# Patient Record
Sex: FEMALE | Race: BLACK OR AFRICAN AMERICAN | NOT HISPANIC OR LATINO | Employment: OTHER | URBAN - METROPOLITAN AREA
[De-identification: names, ages, dates, MRNs, and addresses within clinical notes are randomized per-mention and may not be internally consistent; named-entity substitution may affect disease eponyms.]

---

## 2017-10-30 ENCOUNTER — HISTORICAL (OUTPATIENT)
Dept: INTERNAL MEDICINE | Facility: CLINIC | Age: 72
End: 2017-10-30

## 2017-10-30 LAB
ABS NEUT (OLG): 1.71 X10(3)/MCL (ref 2.1–9.2)
ALBUMIN SERPL-MCNC: 3.7 GM/DL (ref 3.4–5)
ALBUMIN/GLOB SERPL: 1 RATIO (ref 1–2)
ALP SERPL-CCNC: 97 UNIT/L (ref 45–117)
ALT SERPL-CCNC: 17 UNIT/L (ref 12–78)
AST SERPL-CCNC: 28 UNIT/L (ref 15–37)
BASOPHILS # BLD AUTO: 0.02 X10(3)/MCL
BASOPHILS NFR BLD AUTO: 0 % (ref 0–1)
BILIRUB SERPL-MCNC: 0.3 MG/DL (ref 0.2–1)
BILIRUBIN DIRECT+TOT PNL SERPL-MCNC: <0.1 MG/DL
BILIRUBIN DIRECT+TOT PNL SERPL-MCNC: ABNORMAL MG/DL
BUN SERPL-MCNC: 22 MG/DL (ref 7–18)
CALCIUM SERPL-MCNC: 9.5 MG/DL (ref 8.5–10.1)
CHLORIDE SERPL-SCNC: 101 MMOL/L (ref 98–107)
CHOLEST SERPL-MCNC: 169 MG/DL
CHOLEST/HDLC SERPL: 3.2 {RATIO} (ref 0–4.4)
CO2 SERPL-SCNC: 25 MMOL/L (ref 21–32)
CREAT SERPL-MCNC: 1.3 MG/DL (ref 0.6–1.3)
EOSINOPHIL # BLD AUTO: 0.07 X10(3)/MCL
EOSINOPHIL NFR BLD AUTO: 2 % (ref 0–5)
ERYTHROCYTE [DISTWIDTH] IN BLOOD BY AUTOMATED COUNT: 13.9 % (ref 11.5–14.5)
EST. AVERAGE GLUCOSE BLD GHB EST-MCNC: 131 MG/DL
GLOBULIN SER-MCNC: 4.8 GM/ML (ref 2.3–3.5)
GLUCOSE SERPL-MCNC: 96 MG/DL (ref 74–106)
HBA1C MFR BLD: 6.2 % (ref 4.2–6.3)
HCT VFR BLD AUTO: 33.6 % (ref 35–46)
HDLC SERPL-MCNC: 52 MG/DL
HGB BLD-MCNC: 11.8 GM/DL (ref 12–16)
IMM GRANULOCYTES # BLD AUTO: 0.01 10*3/UL
IMM GRANULOCYTES NFR BLD AUTO: 0 %
LDLC SERPL CALC-MCNC: 102 MG/DL (ref 0–130)
LYMPHOCYTES # BLD AUTO: 1.22 X10(3)/MCL
LYMPHOCYTES NFR BLD AUTO: 33 % (ref 15–40)
MCH RBC QN AUTO: 30.1 PG (ref 26–34)
MCHC RBC AUTO-ENTMCNC: 35.1 GM/DL (ref 31–37)
MCV RBC AUTO: 85.7 FL (ref 80–100)
MONOCYTES # BLD AUTO: 0.66 X10(3)/MCL
MONOCYTES NFR BLD AUTO: 18 % (ref 4–12)
NEUTROPHILS # BLD AUTO: 1.71 X10(3)/MCL
NEUTROPHILS NFR BLD AUTO: 46 X10(3)/MCL
PLATELET # BLD AUTO: 338 X10(3)/MCL (ref 130–400)
PMV BLD AUTO: 8.7 FL (ref 7.4–10.4)
POTASSIUM SERPL-SCNC: 3.7 MMOL/L (ref 3.5–5.1)
PROT SERPL-MCNC: 8.5 GM/DL (ref 6.4–8.2)
RBC # BLD AUTO: 3.92 X10(6)/MCL (ref 4–5.2)
SODIUM SERPL-SCNC: 133 MMOL/L (ref 136–145)
TRIGL SERPL-MCNC: 75 MG/DL
TSH SERPL-ACNC: 0.92 MIU/L (ref 0.36–3.74)
VLDLC SERPL CALC-MCNC: 15 MG/DL
WBC # SPEC AUTO: 3.7 X10(3)/MCL (ref 4.5–11)

## 2017-12-06 ENCOUNTER — HISTORICAL (OUTPATIENT)
Dept: RADIOLOGY | Facility: HOSPITAL | Age: 72
End: 2017-12-06

## 2020-07-13 ENCOUNTER — HISTORICAL (OUTPATIENT)
Dept: RADIOLOGY | Facility: HOSPITAL | Age: 75
End: 2020-07-13

## 2021-04-26 ENCOUNTER — HISTORICAL (OUTPATIENT)
Dept: CARDIOLOGY | Facility: HOSPITAL | Age: 76
End: 2021-04-26

## 2021-11-11 ENCOUNTER — HISTORICAL (OUTPATIENT)
Dept: RADIOLOGY | Facility: HOSPITAL | Age: 76
End: 2021-11-11

## 2022-03-08 ENCOUNTER — HISTORICAL (OUTPATIENT)
Dept: ADMINISTRATIVE | Facility: HOSPITAL | Age: 77
End: 2022-03-08

## 2022-04-07 ENCOUNTER — HISTORICAL (OUTPATIENT)
Dept: ADMINISTRATIVE | Facility: HOSPITAL | Age: 77
End: 2022-04-07
Payer: COMMERCIAL

## 2022-04-23 VITALS
SYSTOLIC BLOOD PRESSURE: 183 MMHG | DIASTOLIC BLOOD PRESSURE: 82 MMHG | WEIGHT: 166.88 LBS | HEIGHT: 61 IN | BODY MASS INDEX: 31.51 KG/M2

## 2022-06-16 ENCOUNTER — HOSPITAL ENCOUNTER (EMERGENCY)
Facility: HOSPITAL | Age: 77
Discharge: HOME | End: 2022-06-16
Attending: EMERGENCY MEDICINE
Payer: COMMERCIAL

## 2022-06-16 VITALS
OXYGEN SATURATION: 96 % | BODY MASS INDEX: 27.49 KG/M2 | HEIGHT: 65 IN | DIASTOLIC BLOOD PRESSURE: 87 MMHG | TEMPERATURE: 98 F | SYSTOLIC BLOOD PRESSURE: 157 MMHG | WEIGHT: 165 LBS | HEART RATE: 76 BPM | RESPIRATION RATE: 16 BRPM

## 2022-06-16 DIAGNOSIS — I10 HYPERTENSION, UNSPECIFIED TYPE: ICD-10-CM

## 2022-06-16 DIAGNOSIS — R07.9 CHEST PAIN: ICD-10-CM

## 2022-06-16 DIAGNOSIS — R41.82 ALTERED MENTAL STATUS, UNSPECIFIED ALTERED MENTAL STATUS TYPE: ICD-10-CM

## 2022-06-16 DIAGNOSIS — R42 DIZZINESS: ICD-10-CM

## 2022-06-16 DIAGNOSIS — R51.9 NONINTRACTABLE HEADACHE, UNSPECIFIED CHRONICITY PATTERN, UNSPECIFIED HEADACHE TYPE: Primary | ICD-10-CM

## 2022-06-16 PROBLEM — F05 ACUTE CONFUSIONAL STATE: Status: ACTIVE | Noted: 2022-06-16

## 2022-06-16 LAB
ABS NEUT (OLG): 2.9 X10(3)/MCL (ref 2.1–9.2)
ALBUMIN SERPL-MCNC: 3.6 GM/DL (ref 3.4–4.8)
ALBUMIN/GLOB SERPL: 0.8 RATIO (ref 1.1–2)
ALP SERPL-CCNC: 101 UNIT/L (ref 40–150)
ALT SERPL-CCNC: 15 UNIT/L (ref 0–55)
AST SERPL-CCNC: 37 UNIT/L (ref 5–34)
BILIRUBIN DIRECT+TOT PNL SERPL-MCNC: 0.4 MG/DL
BUN SERPL-MCNC: 9.9 MG/DL (ref 9.8–20.1)
CALCIUM SERPL-MCNC: 9.5 MG/DL (ref 8.4–10.2)
CHLORIDE SERPL-SCNC: 108 MMOL/L (ref 98–107)
CO2 SERPL-SCNC: 23 MMOL/L (ref 23–31)
CREAT SERPL-MCNC: 0.91 MG/DL (ref 0.55–1.02)
ERYTHROCYTE [DISTWIDTH] IN BLOOD BY AUTOMATED COUNT: 18.8 % (ref 11.5–17)
ETHANOL SERPL-MCNC: <10 MG/DL
FLUAV AG UPPER RESP QL IA.RAPID: NOT DETECTED
FLUBV AG UPPER RESP QL IA.RAPID: NOT DETECTED
GLOBULIN SER-MCNC: 4.5 GM/DL (ref 2.4–3.5)
GLUCOSE SERPL-MCNC: 111 MG/DL (ref 82–115)
HCT VFR BLD AUTO: 35.2 % (ref 37–47)
HGB BLD-MCNC: 11.1 GM/DL (ref 12–16)
IMM GRANULOCYTES # BLD AUTO: 0.02 X10(3)/MCL (ref 0–0.02)
IMM GRANULOCYTES NFR BLD AUTO: 0.4 % (ref 0–0.43)
INSTRUMENT WBC (OLG): 5 X10(3)/MCL
LYMPHOCYTES NFR BLD MANUAL: 1.65 X10(3)/MCL
LYMPHOCYTES NFR BLD MANUAL: 33 %
MACROCYTES BLD QL SMEAR: ABNORMAL
MCH RBC QN AUTO: 26.4 PG (ref 27–31)
MCHC RBC AUTO-ENTMCNC: 31.5 MG/DL (ref 33–36)
MCV RBC AUTO: 83.6 FL (ref 80–94)
MONOCYTES NFR BLD MANUAL: 0.45 X10(3)/MCL (ref 0.1–1.3)
MONOCYTES NFR BLD MANUAL: 9 %
NEUTROPHILS NFR BLD MANUAL: 58 %
NRBC BLD AUTO-RTO: 0 %
PLATELET # BLD AUTO: 307 X10(3)/MCL (ref 130–400)
PLATELET # BLD EST: NORMAL 10*3/UL
PMV BLD AUTO: 9.3 FL (ref 9.4–12.4)
POCT GLUCOSE: 83 MG/DL (ref 70–110)
POTASSIUM SERPL-SCNC: 4.8 MMOL/L (ref 3.5–5.1)
PROT SERPL-MCNC: 8.1 GM/DL (ref 5.8–7.6)
RBC # BLD AUTO: 4.21 X10(6)/MCL (ref 4.2–5.4)
RBC MORPH BLD: ABNORMAL
SARS-COV-2 RNA RESP QL NAA+PROBE: NOT DETECTED
SODIUM SERPL-SCNC: 141 MMOL/L (ref 136–145)
TROPONIN I SERPL-MCNC: <0.01 NG/ML (ref 0–0.04)
WBC # SPEC AUTO: 4.6 X10(3)/MCL (ref 4.5–11.5)

## 2022-06-16 PROCEDURE — 87636 SARSCOV2 & INF A&B AMP PRB: CPT | Performed by: NURSE PRACTITIONER

## 2022-06-16 PROCEDURE — 96374 THER/PROPH/DIAG INJ IV PUSH: CPT

## 2022-06-16 PROCEDURE — 36415 COLL VENOUS BLD VENIPUNCTURE: CPT | Performed by: STUDENT IN AN ORGANIZED HEALTH CARE EDUCATION/TRAINING PROGRAM

## 2022-06-16 PROCEDURE — 99285 EMERGENCY DEPT VISIT HI MDM: CPT | Mod: 25

## 2022-06-16 PROCEDURE — 63600175 PHARM REV CODE 636 W HCPCS: Performed by: STUDENT IN AN ORGANIZED HEALTH CARE EDUCATION/TRAINING PROGRAM

## 2022-06-16 PROCEDURE — 85007 BL SMEAR W/DIFF WBC COUNT: CPT | Performed by: NURSE PRACTITIONER

## 2022-06-16 PROCEDURE — 82077 ASSAY SPEC XCP UR&BREATH IA: CPT | Performed by: STUDENT IN AN ORGANIZED HEALTH CARE EDUCATION/TRAINING PROGRAM

## 2022-06-16 PROCEDURE — 84484 ASSAY OF TROPONIN QUANT: CPT | Performed by: NURSE PRACTITIONER

## 2022-06-16 PROCEDURE — 36415 COLL VENOUS BLD VENIPUNCTURE: CPT | Performed by: NURSE PRACTITIONER

## 2022-06-16 PROCEDURE — 85025 COMPLETE CBC W/AUTO DIFF WBC: CPT | Performed by: NURSE PRACTITIONER

## 2022-06-16 PROCEDURE — 80053 COMPREHEN METABOLIC PANEL: CPT | Performed by: NURSE PRACTITIONER

## 2022-06-16 RX ORDER — HYDRALAZINE HYDROCHLORIDE 20 MG/ML
20 INJECTION INTRAMUSCULAR; INTRAVENOUS
Status: COMPLETED | OUTPATIENT
Start: 2022-06-16 | End: 2022-06-16

## 2022-06-16 RX ORDER — HYDRALAZINE HYDROCHLORIDE 20 MG/ML
20 INJECTION INTRAMUSCULAR; INTRAVENOUS
Status: DISCONTINUED | OUTPATIENT
Start: 2022-06-16 | End: 2022-06-16

## 2022-06-16 RX ADMIN — HYDRALAZINE HYDROCHLORIDE 20 MG: 20 INJECTION, SOLUTION INTRAMUSCULAR; INTRAVENOUS at 01:06

## 2022-06-16 NOTE — ASSESSMENT & PLAN NOTE
Patient examined by neurology on call .... no recommendation for IV tPA  Request medical records from [facility] to resume medications

## 2022-06-16 NOTE — CONSULTS
"[facility]  Emergency Dept  Neurology  Consult Note    Patient Name: Dolores Ta  MRN: [id number]  Admission Date: 6/16/2022  Hospital Length of Stay: 0 days  Code Status: No Order   Attending Provider: Anders Yu IV, MD   Consulting Provider: Nasim Perez MD  Primary Care Physician: Primary Doctor No  Principal Problem:<principal problem not specified>    Inpatient consult to Vascular (Stroke) Neurology  Consult performed by: ESMER Chinchilla  Consult ordered by: ESMER Chinchilla         Subjective:     Chief Complaint:    Chief Complaint   Patient presents with    Headache     HA and nausea that started a few days ago. Elevated BP today per patient.  Hx of HTN.         HPI:   Ms Ta is a 76-year-old female with past medical history of anxiety, HTN, chronic HA, presented to ED on 6/16 with reports of abdominal pain, headache, and "not feeling right".  She reports using her emergency call button this morning because of abdominal pain, and was brought to ED for further evaluation.    While in ED, patient became confused.  No unilateral weakness.  Stroke alert was activated.  CTh showed no acute intracranial abnormalities.  Discussed with neurology on call, who examined patient in ED ... no recommendation for IV tPA due to no focal symptoms.    Stroke alert activated at 1224  Stroke NP responded at 1226      Of note, patient's daughter (Katie) stated that patient recently stopped taking her medications.  Reports similar episode of confusion, which is why she was seen at [facility] and started on medications.       Past Medical History:   Diagnosis Date    Hypertension        No past surgical history on file.    Review of patient's allergies indicates:  No Known Allergies      No current facility-administered medications on file prior to encounter.     No current outpatient medications on file prior to encounter.     Family History    None       Tobacco Use    Smoking " status: Not on file    Smokeless tobacco: Not on file   Substance and Sexual Activity    Alcohol use: Not on file    Drug use: Not on file    Sexual activity: Not on file     Review of Systems   Psychiatric/Behavioral:  Positive for confusion.    All other systems reviewed and are negative.    Objective:     Vital Signs (Most Recent):  Temp: 98.2 °F (36.8 °C) (06/16/22 0755)  Pulse: (!) 54 (06/16/22 1346)  Resp: 15 (06/16/22 1346)  BP: (!) 159/80 (06/16/22 1346)  SpO2: 100 % (06/16/22 1346)   Vital Signs (24h Range):  Temp:  [98.2 °F (36.8 °C)] 98.2 °F (36.8 °C)  Pulse:  [38-55] 54  Resp:  [15-21] 15  SpO2:  [99 %-100 %] 100 %  BP: (159-197)/() 159/80     Weight: 74.8 kg (165 lb)  Body mass index is 27.46 kg/m².    Physical Exam  Constitutional:       Appearance: She is not ill-appearing or toxic-appearing.   Eyes:      General: No visual field deficit.     Extraocular Movements: Extraocular movements intact.      Pupils: Pupils are equal, round, and reactive to light.   Cardiovascular:      Rate and Rhythm: Bradycardia present.   Pulmonary:      Effort: Pulmonary effort is normal.   Skin:     General: Skin is warm and dry.   Neurological:      Mental Status: She is alert. She is confused.      Cranial Nerves: No dysarthria or facial asymmetry.      Sensory: Sensation is intact.      Motor: Motor function is intact.       Significant Labs: BMP:   Recent Labs   Lab 06/16/22  1010      K 4.8   CO2 23   BUN 9.9   CREATININE 0.91   CALCIUM 9.5     CBC:   Recent Labs   Lab 06/16/22  1011   WBC 4.6   HGB 11.1*   HCT 35.2*          Significant Imaging: I have reviewed all pertinent imaging results/findings within the past 24 hours.    CTH: no acute intracranial abnormalities    Assessment and Plan:     Acute confusional state  Patient examined by neurology on call .... no recommendation for IV tPA  Request medical records from [facility] to resume medications        Thank you for your consult. Further  recommendations to follow by MD.    Manasa Alves, RONEYP  Neurology  [facility] - Emergency Dept

## 2022-06-16 NOTE — SUBJECTIVE & OBJECTIVE
Past Medical History:   Diagnosis Date    Hypertension        No past surgical history on file.    Review of patient's allergies indicates:  No Known Allergies      No current facility-administered medications on file prior to encounter.     No current outpatient medications on file prior to encounter.     Family History    None       Tobacco Use    Smoking status: Not on file    Smokeless tobacco: Not on file   Substance and Sexual Activity    Alcohol use: Not on file    Drug use: Not on file    Sexual activity: Not on file     Review of Systems   Psychiatric/Behavioral:  Positive for confusion.    All other systems reviewed and are negative.    Objective:     Vital Signs (Most Recent):  Temp: 98.2 °F (36.8 °C) (06/16/22 0755)  Pulse: (!) 54 (06/16/22 1346)  Resp: 15 (06/16/22 1346)  BP: (!) 159/80 (06/16/22 1346)  SpO2: 100 % (06/16/22 1346)   Vital Signs (24h Range):  Temp:  [98.2 °F (36.8 °C)] 98.2 °F (36.8 °C)  Pulse:  [38-55] 54  Resp:  [15-21] 15  SpO2:  [99 %-100 %] 100 %  BP: (159-197)/() 159/80     Weight: 74.8 kg (165 lb)  Body mass index is 27.46 kg/m².    Physical Exam  Constitutional:       Appearance: She is not ill-appearing or toxic-appearing.   Eyes:      General: No visual field deficit.     Extraocular Movements: Extraocular movements intact.      Pupils: Pupils are equal, round, and reactive to light.   Cardiovascular:      Rate and Rhythm: Bradycardia present.   Pulmonary:      Effort: Pulmonary effort is normal.   Skin:     General: Skin is warm and dry.   Neurological:      Mental Status: She is alert. She is confused.      Cranial Nerves: No dysarthria or facial asymmetry.      Sensory: Sensation is intact.      Motor: Motor function is intact.       Significant Labs: BMP:   Recent Labs   Lab 06/16/22  1010      K 4.8   CO2 23   BUN 9.9   CREATININE 0.91   CALCIUM 9.5     CBC:   Recent Labs   Lab 06/16/22  1011   WBC 4.6   HGB 11.1*   HCT 35.2*          Significant  Imaging: I have reviewed all pertinent imaging results/findings within the past 24 hours.    CTH: no acute intracranial abnormalities

## 2022-06-16 NOTE — ED PROVIDER NOTES
"Encounter Date: 6/16/2022       History     Chief Complaint   Patient presents with    Headache     HA and nausea that started a few days ago. Elevated BP today per patient.  Hx of HTN.      Patient presents with:  Headache: HA and nausea that started a few days ago. Elevated BP today per patient.  Hx of HTN.     I Celestino Mottkaley ALVAREZ assumed care of pt at 1138, elderly female with history of hypertension anxiety and chronic headaches reports having anxiety and vomiting last night  having severe headache this morning with nausea vomiting and dizziness "drunk like feeling "reports near fall while walking across house this morning pressing emergency on call button and transported by ambulance to emergency department for initial evaluation.    I, Anders Yu, assumed care of patient from Celestino Ramesh ALVAREZ. Patient with history of HTN, anxiety, headaches, depression on medications, recent bizarre behavior, (possibly early onset dementia per previous chart review). presenting after she called EMS for feeling "not right',, headache, diffuse myalgias. Very poor historian. Collateral provided by daughters later report patient sometimes acts bizarre when not taking medications - reports that she has had multiple episodes of bizarre behavior over past months, is seen at [facility] and prescribed antidepressants there. Didn't take her antidepressants for the past few days.     Patient denies SI/HI/AVH.     The history is provided by the patient. No  was used.     Review of patient's allergies indicates:  No Known Allergies  Past Medical History:   Diagnosis Date    Hypertension      History reviewed. No pertinent surgical history.  History reviewed. No pertinent family history.     Review of Systems   Constitutional: Negative for chills and fever.   HENT: Positive for sinus pressure and sneezing. Negative for sore throat.    Eyes: Negative for pain.   Respiratory: Negative for shortness of breath.  "   Cardiovascular: Positive for chest pain.   Gastrointestinal: Positive for nausea and vomiting. Negative for diarrhea.   Endocrine: Negative.    Genitourinary: Negative for dysuria.   Musculoskeletal: Negative for back pain and myalgias.   Skin: Negative for rash.   Neurological: Positive for dizziness and headaches. Negative for syncope.   Hematological: Does not bruise/bleed easily.   Psychiatric/Behavioral: The patient is nervous/anxious.        Physical Exam     Initial Vitals [06/16/22 0755]   BP Pulse Resp Temp SpO2   (!) 167/84 (!) 48 18 98.2 °F (36.8 °C) 100 %      MAP       --         Physical Exam    Nursing note and vitals reviewed.  Constitutional: She appears well-developed and well-nourished. She is not diaphoretic. She is active.  Non-toxic appearance. No distress.   HENT:   Head: Normocephalic and atraumatic.   Eyes: Conjunctivae and EOM are normal. Pupils are equal, round, and reactive to light.   Pupils 3 mm to 2 mm bilaterally   Neck: Trachea normal. Neck supple. No JVD present.   Normal range of motion.  Cardiovascular: Normal pulses. Bradycardia present.    Pulmonary/Chest: Breath sounds normal. No respiratory distress.   Abdominal: Abdomen is soft. She exhibits no distension. There is no abdominal tenderness.   Musculoskeletal:         General: No edema. Normal range of motion.      Right upper arm: Normal.      Left upper arm: Normal.      Cervical back: Normal, normal range of motion and neck supple.      Thoracic back: Normal.      Lumbar back: Normal.      Right upper leg: Normal.      Left upper leg: Normal.      Right lower leg: Normal.      Left lower leg: Normal.     Neurological: She is alert. She has normal strength.   Pt with expressive aphasia. She is stuttering and unable to formulate words. Pt acutely agitated. Awake, alert.   Skin: Skin is warm, dry and intact.   Psychiatric: Her mood appears anxious.         ED Course   Procedures  Labs Reviewed   COMPREHENSIVE METABOLIC PANEL  - Abnormal; Notable for the following components:       Result Value    Chloride 108 (*)     Protein Total 8.1 (*)     Globulin 4.5 (*)     Albumin/Globulin Ratio 0.8 (*)     Aspartate Aminotransferase 37 (*)     All other components within normal limits   CBC WITH DIFFERENTIAL - Abnormal; Notable for the following components:    Hgb 11.1 (*)     Hct 35.2 (*)     MCH 26.4 (*)     MCHC 31.5 (*)     RDW 18.8 (*)     MPV 9.3 (*)     IG# 0.02 (*)     All other components within normal limits   MANUAL DIFFERENTIAL - Abnormal; Notable for the following components:    Abs Lymp 1.65 (*)     RBC Morph Abnormal (*)     Macrocyte 1+ (*)     All other components within normal limits   TROPONIN I - Normal   COVID/FLU A&B PCR - Normal   ALCOHOL,MEDICAL (ETHANOL) - Normal   CBC W/ AUTO DIFFERENTIAL    Narrative:     The following orders were created for panel order CBC Auto Differential.  Procedure                               Abnormality         Status                     ---------                               -----------         ------                     CBC with Differential[[id number]]        Abnormal            Final result               Manual Differential[[id number]]          Abnormal            Final result                 Please view results for these tests on the individual orders.   POCT GLUCOSE     EKG Readings: (Independently Interpreted)   Ectopy: PVCs. Conduction: Normal. ST Segments: Normal ST Segments. T Waves: Normal. Axis: Normal.   Time 0801   Sinus mahi at 52     ECG Results          EKG 12-lead (Final result)  Result time 06/16/22 17:13:02    Final result by Interface, Lab In [facility] (06/16/22 17:13:02)                 Narrative:    Test Reason : R07.9,    Vent. Rate : 052 BPM     Atrial Rate : 052 BPM     P-R Int : 204 ms          QRS Dur : 094 ms      QT Int : 446 ms       P-R-T Axes : 072 -16 043 degrees     QTc Int : 414 ms    Sinus bradycardia with marked sinus arrythmia with occasional  Premature  ventricular complexes  Otherwise normal ECG  No previous ECGs available  Confirmed by Xiao ARECHIGA, Michelet ([id number]) on 6/16/2022 5:12:50 PM    Referred By: MARIANA   SELF           Confirmed By:Michelet Hagen MD                            Imaging Results          CT Head Without Contrast (Final result)  Result time 06/16/22 12:38:40    Final result by Jerry De Guzman MD (06/16/22 12:38:40)                 Impression:      No acute intracranial abnormality identified.      Electronically signed by: Jerry De Guzman  Date:    06/16/2022  Time:    12:38             Narrative:    EXAMINATION:  CT HEAD WITHOUT CONTRAST    CLINICAL HISTORY:  Headache, sudden, severe;    TECHNIQUE:  Low dose axial images were obtained through the head.  Coronal and sagittal reformations were also performed. Contrast was not administered.    Automatic exposure control was utilized to reduce the patient's radiation dose.    DLP= 903    COMPARISON:  03/08/2022    FINDINGS:  No acute intracranial hemorrhage, edema or mass. No acute parenchymal abnormality.    Mild cerebral atrophy with concordant ventricular enlargement.    Scattered hypodensities throughout the deep periventricular white matter.    The osseous structures are normal.    The mastoid air cells are clear.    The auditory canals are patent bilaterally.    The globes and orbital contents are normal bilaterally.    The visualized maxillary, ethmoid and sphenoid sinuses are clear.                              X-Rays:   Independently Interpreted Readings:   Head CT: No hemorrhage.     Medications   hydrALAZINE injection 20 mg (20 mg Intravenous Given 6/16/22 1332)     Medical Decision Making:   History:   I obtained history from: EMS provider and someone other than patient.       <> Summary of History: 3 daughters bedside - see ED course   Old Medical Records: I decided to obtain old medical records.  Initial Assessment:   77 yo , poor historian, history HTN, depression,  anxiety, initially seen by midlevel for headaches, malaise, dizziness. Patient called EMS herself, lives alone. Patient initially hypertensive, bradycardic, concern for head bleed/acs. On my exam patient seemed to be aphasic without focal neuro deficits, so code fast called, returned to normal mental status quickly, negative head ct, neurology didn't recommend any further workup.    Rest of workup unremarkable, patient's daughters showed up to provide collateral history in ed course. Seems patient has these episodes from time to time, especially when noncompliant with depression medications. Possible early onset dementia per chart review? Patient not currently suicidal, homicidal, or responding to internal stimuli. Due to concern over patient not taking meds, considered and discussed PEC or admission with daughter. They feel patient is safe at home and will stay with her until she sees her psychiatrist at [facility]. Decision made to discharge in care of daughters. Return precautions given. Patient was bradycardic initially but stable and asymptomatic from that standpoint. Normal HR on discharge. Improved BP after hydralazine.   Clinical Tests:   Lab Tests: Ordered and Reviewed  Radiological Study: Reviewed and Ordered  Medical Tests: Ordered and Reviewed             ED Course as of 06/16/22 2051   Thu Jun 16, 2022   1210 Case reviewed in ER with Dr Yu [TQ]   1215 Patient now with new neuro status changes slurred speech activated code fast. [TQ]   1230 Patient discussed with me by [facility]le[facility] provider - history of chronic headaches, HTN, anxiety - presenting for headache, noted to be bradycardic. Provider requested that I come evaluate the patient, when I started to speak with her she became asphasic, struggling to get words out, agitated. Glucose 83. CODE fast activated. Will obtain stat head CT - possible bleed in setting of hypertension , AMS, bradycardia.  [AC]   1244 CT head no bleed per my read [AC]   1509 Patient  "back to baseline in CT and at this time - reporting to me that she "gets like this when she doesn't take her medicine". Family en route to provide collateral.  [AC]   8299 Spoke at length with 3 of patient's daughter bedside - they report that mom goes to [facility] for medications for depression, has had some abnormal behaviors over past several months - has [facility] counseling classes for coping with the loss of her  there but hasn't been going. Seems undergoing outpateint workup for dementia? Patient deneis SI/HI/AVH at this time. Daughters report that they believe patient is safe at home - they will take her home and stay with her and bring patient to [facility] tomorrow for further evaluation. Patient medical workup unremarkable. Will discharge home at this time in daughter's care - they are bedside and amendable to this plan.  [AC]      ED Course User Index  [AC] Anders Yu IV, MD  [TQ] ANTONIO Vásquez             Clinical Impression:   Final diagnoses:  [R51.9] Nonintractable headache, unspecified chronicity pattern, unspecified headache type (Primary)  [I10] Hypertension, unspecified type  [R42] Dizziness  [R41.82] Altered mental status, unspecified altered mental status type          ED Disposition Condition    Discharge Stable        ED Prescriptions     None        Follow-up Information     Follow up With Specialties Details Why Contact Info    [facility] - Emergency Dept Emergency Medicine Go to  If symptoms worsen [address] [facility] [id number] [phone]    PCP  Schedule an appointment as soon as possible for a visit         I, Anders Yu MD personally performed the history, PE, MDM, and procedures as documented above and agree with the scribe's documentation.        Anders Yu IV, MD  06/16/22 2051    "

## 2022-06-16 NOTE — HPI
"Ms Ta is a 76-year-old female with past medical history of anxiety, HTN, chronic HA, presented to ED on 6/16 with reports of abdominal pain, headache, and "not feeling right".  She reports using her emergency call button this morning because of abdominal pain, and was brought to ED for further evaluation.    While in ED, patient became confused.  No unilateral weakness.  Stroke alert was activated.  CTh showed no acute intracranial abnormalities.  Discussed with neurology on call, who examined patient in ED ... no recommendation for IV tPA due to no focal symptoms.    Stroke alert activated at 1224  Stroke NP responded at 1226      Of note, patient's daughter (Katie) stated that patient recently stopped taking her medications.  Reports similar episode of confusion, which is why she was seen at [facility] and started on medications.  "

## 2022-07-11 DIAGNOSIS — I10 HYPERTENSION, UNSPECIFIED TYPE: Primary | ICD-10-CM

## 2022-07-11 RX ORDER — AMLODIPINE BESYLATE 5 MG/1
5 TABLET ORAL DAILY
COMMUNITY
Start: 2021-10-01 | End: 2022-07-11 | Stop reason: SDUPTHER

## 2022-07-14 RX ORDER — AMLODIPINE BESYLATE 5 MG/1
5 TABLET ORAL DAILY
Qty: 30 TABLET | Refills: 1 | Status: SHIPPED | OUTPATIENT
Start: 2022-07-14 | End: 2022-07-21 | Stop reason: SDUPTHER

## 2022-07-15 RX ORDER — LISINOPRIL 40 MG/1
40 TABLET ORAL DAILY
Qty: 30 TABLET | Refills: 0 | Status: CANCELLED | OUTPATIENT
Start: 2022-07-15 | End: 2022-08-14

## 2022-07-15 RX ORDER — LISINOPRIL 40 MG/1
40 TABLET ORAL DAILY
COMMUNITY
Start: 2022-06-13 | End: 2022-07-21 | Stop reason: SDUPTHER

## 2022-07-15 RX ORDER — AMLODIPINE BESYLATE 5 MG/1
5 TABLET ORAL DAILY
Qty: 30 TABLET | Refills: 1 | Status: CANCELLED | OUTPATIENT
Start: 2022-07-15

## 2022-07-21 ENCOUNTER — OFFICE VISIT (OUTPATIENT)
Dept: FAMILY MEDICINE | Facility: CLINIC | Age: 77
End: 2022-07-21
Payer: COMMERCIAL

## 2022-07-21 VITALS
WEIGHT: 169.56 LBS | SYSTOLIC BLOOD PRESSURE: 149 MMHG | TEMPERATURE: 99 F | HEIGHT: 65 IN | HEART RATE: 50 BPM | DIASTOLIC BLOOD PRESSURE: 78 MMHG | BODY MASS INDEX: 28.25 KG/M2 | OXYGEN SATURATION: 100 %

## 2022-07-21 DIAGNOSIS — R41.89 COGNITIVE IMPAIRMENT: ICD-10-CM

## 2022-07-21 DIAGNOSIS — K21.9 GASTROESOPHAGEAL REFLUX DISEASE, UNSPECIFIED WHETHER ESOPHAGITIS PRESENT: ICD-10-CM

## 2022-07-21 DIAGNOSIS — Z87.891 HISTORY OF TOBACCO ABUSE: ICD-10-CM

## 2022-07-21 DIAGNOSIS — I10 HYPERTENSION, UNSPECIFIED TYPE: Primary | ICD-10-CM

## 2022-07-21 PROCEDURE — 99213 OFFICE O/P EST LOW 20 MIN: CPT | Mod: PBBFAC

## 2022-07-21 RX ORDER — ESCITALOPRAM OXALATE 5 MG/1
10 TABLET ORAL DAILY
COMMUNITY
Start: 2021-10-25 | End: 2022-07-21 | Stop reason: SDUPTHER

## 2022-07-21 RX ORDER — LISINOPRIL 40 MG/1
40 TABLET ORAL DAILY
Qty: 30 TABLET | Refills: 3 | Status: SHIPPED | OUTPATIENT
Start: 2022-07-21 | End: 2022-11-08

## 2022-07-21 RX ORDER — DONEPEZIL HYDROCHLORIDE 5 MG/1
5 TABLET, FILM COATED ORAL EVERY MORNING
COMMUNITY
Start: 2022-06-13 | End: 2022-07-21 | Stop reason: SDUPTHER

## 2022-07-21 RX ORDER — TRAZODONE HYDROCHLORIDE 50 MG/1
50 TABLET ORAL DAILY
COMMUNITY
Start: 2021-10-25 | End: 2022-07-21 | Stop reason: SDUPTHER

## 2022-07-21 RX ORDER — DONEPEZIL HYDROCHLORIDE 5 MG/1
5 TABLET, FILM COATED ORAL DAILY
COMMUNITY
Start: 2021-10-25 | End: 2022-07-21

## 2022-07-21 RX ORDER — AMLODIPINE BESYLATE 5 MG/1
5 TABLET ORAL DAILY
Qty: 30 TABLET | Refills: 3 | Status: SHIPPED | OUTPATIENT
Start: 2022-07-21 | End: 2022-09-20

## 2022-07-21 RX ORDER — TRAZODONE HYDROCHLORIDE 50 MG/1
50 TABLET ORAL NIGHTLY
Qty: 30 TABLET | Refills: 3 | Status: SHIPPED | OUTPATIENT
Start: 2022-07-21 | End: 2022-12-30 | Stop reason: SDUPTHER

## 2022-07-21 RX ORDER — TRAZODONE HYDROCHLORIDE 50 MG/1
50 TABLET ORAL NIGHTLY
Qty: 30 TABLET | Refills: 1 | Status: SHIPPED | OUTPATIENT
Start: 2022-07-21 | End: 2022-07-21 | Stop reason: SDUPTHER

## 2022-07-21 RX ORDER — ESCITALOPRAM OXALATE 10 MG/1
10 TABLET ORAL EVERY MORNING
COMMUNITY
Start: 2022-06-13 | End: 2022-07-21 | Stop reason: SDUPTHER

## 2022-07-21 RX ORDER — MEMANTINE HYDROCHLORIDE 10 MG/1
10 TABLET ORAL 2 TIMES DAILY
Qty: 60 TABLET | Refills: 3 | Status: SHIPPED | OUTPATIENT
Start: 2022-07-21 | End: 2022-09-20 | Stop reason: SINTOL

## 2022-07-21 RX ORDER — DONEPEZIL HYDROCHLORIDE 5 MG/1
5 TABLET, FILM COATED ORAL EVERY MORNING
Qty: 30 TABLET | Refills: 1 | Status: SHIPPED | OUTPATIENT
Start: 2022-07-21 | End: 2022-07-21 | Stop reason: SDUPTHER

## 2022-07-21 RX ORDER — ESCITALOPRAM OXALATE 5 MG/1
10 TABLET ORAL DAILY
Qty: 60 TABLET | Refills: 3 | Status: SHIPPED | OUTPATIENT
Start: 2022-07-21 | End: 2022-09-08 | Stop reason: SDUPTHER

## 2022-07-21 RX ORDER — MEMANTINE HYDROCHLORIDE 10 MG/1
10 TABLET ORAL 2 TIMES DAILY
COMMUNITY
Start: 2022-07-14 | End: 2022-07-21 | Stop reason: SDUPTHER

## 2022-07-21 RX ORDER — ESCITALOPRAM OXALATE 10 MG/1
10 TABLET ORAL EVERY MORNING
Qty: 30 TABLET | Refills: 1 | Status: SHIPPED | OUTPATIENT
Start: 2022-07-21 | End: 2022-07-21 | Stop reason: SDUPTHER

## 2022-07-21 RX ORDER — TRAZODONE HYDROCHLORIDE 50 MG/1
50 TABLET ORAL NIGHTLY
COMMUNITY
Start: 2022-06-13 | End: 2022-07-21 | Stop reason: SDUPTHER

## 2022-07-21 NOTE — PROGRESS NOTES
[facility] Geriatrics clinic    Subjective:        Dolores Ta is a 76 y.o. female who  has a past medical history of Hypertension. GERD, obesity, tobacco abuse   Who presents to  Geriatrics clinic today for follow up. Last office visit in 3/2022 for establishment of care with Geriatric clinic. Alysia (daughter) is POA . She is present with patient today and assists in providing history     Patient  Was previously being treated at [facility] 3x/week for anxiety. Patient's daughter states she was doing well, but patient's daughter reports that her other daughter discontinued care at [facility], and was told that she needs to be re-established as a new patient. Patient would like Alysia to be her POA, and has signed paper work today in order to make daughter Alysia primary POA and her other daughter Katie as her secondary POA.   Home health referral was sent for assistance with medication management and transportation help, patient states that this has not yet been set up . Patient was previously followed by Dr. Nila Napier; it appears that she has not yet been re-assigned to a new PCP, and she would like to be set up in [facility] for her primary care needs.     Main problem today was medication reconciliation- appears that aricept was discontinued on her last visit in 3/2022, but patient is still prescribed this medication. Otherwise, no complaints at this time. Patient and daughter have not noted any further worsening of cognitive status. She denies any fever, chills, headache, blurring of vision, chest pain, shortness of breath, abdominal pain, nausea, vomiting, urinary or bowel issues.     Geriatrics Assessment  BADLs: bathes independently, grooming and dressing independently, no continence issues, feeds independently  IADLs:  Daughter manages finances, still drives, goes shopping, no issues with meal preparation or laundry  Fall risk: last fall: reports 1 fall in the past 3 months,  no LOC or head trauma  Labs: TSH wnl,  Cognition: SLUMS 7/30 (3/2022)  Mood: appropriate,  denies depressive symptoms  Polypharmacy: high risk medications: escitalopram, trazodone, donepezil   Social support: Children come to visit her regularly  Goals of care: daughter wants patient to continue living at home with support from family members as well as home health   Advance care preferences: power of - Alysia (daughter)   Nutrition/weight change: no weight loss, good appetite   Vision: No recent changes to vision; wears glasses  Hearing: No issues with hearing   Living situation: lives alone        Review of Systems:  10 point ROS negative except for HPI    Objective:   Vital Signs:  Pulse 50 /78     BMI 28.2    General:  Well developed, well nourished, no acute respiratory distress  Head: Normocephalic, atraumatic  Eyes: PERRL, EOMI, anicteric sclera  Throat: No posterior pharyngeal erythema or exudate, no tonsillar exudate  Neck: supple, normal ROM, no thyromegaly   CVS:  RRR, S1 and S2 normal, no murmurs, no added heart sounds, rubs, gallops, 2+ peripheral pulses  Resp:  Lungs clear to auscultation bilaterally, no wheezes, rales, or rhonci  GI:  Abdomen soft, non-tender, non-distended, normoactive bowel sounds  MSK:  No muscle atrophy, cyanosis, peripheral edema, full range of motion  Skin:  No rashes, ulcers, erythema  Neuro:  Alert and oriented x3, No focal neuro deficits, CNII-XII grossly intact  Psych:  Appropriate mood and affect         Laboratory:  Lab Results   Component Value Date    WBC 4.6 06/16/2022    HGB 11.1 (L) 06/16/2022    HCT 35.2 (L) 06/16/2022     06/16/2022    MCV 83.6 06/16/2022    RDW 18.8 (H) 06/16/2022    Lab Results   Component Value Date     06/16/2022    K 4.8 06/16/2022    CO2 23 06/16/2022    BUN 9.9 06/16/2022    CREATININE 0.91 06/16/2022    CALCIUM 9.5 06/16/2022      Lab Results   Component Value Date    HGBA1C 6.2 10/30/2017     (H)  10/30/2017    CREATININE 0.91 06/16/2022    Lab Results   Component Value Date    TSH 1.4533 03/08/2022                  Current Medications:  Current Outpatient Medications   Medication Instructions    amLODIPine (NORVASC) 5 mg, Oral, Daily    donepeziL (ARICEPT) 5 mg, Oral, Every morning    EScitalopram oxalate (LEXAPRO) 10 mg, Oral, Every morning    lisinopriL (PRINIVIL,ZESTRIL) 40 mg, Oral, Daily    traZODone (DESYREL) 50 mg, Oral, Nightly        Assessment and Plan:    Cognitive Impairment:  -SLUMS exam 3/24/22 7/30  -Donepezil was discontinued on last visit due to concerns for bradycardia, weakness/ dizziness, however appears that patient is still prescribed this and still taking   -DISCONTINUE donepezil at this time  -Continue Memantine 10 mg BID  -Repeat SLUMS assessment at next visit, consider checking RPR at this time, as it appears this was not completed as part of initial cognitive impairment workup     Anxiety  -taking Lexapro and trazodone  -Patient needs to be re-established at [facility]     HTN  -continue   lisinopril 40mg daily, amlodipine 5 daily  -previously prescribed Hydralazine 25 BID, patient states that this was discontinued by her psychiatrist at [facility]  -Will request records from [facility]  -BP slightly elevated today  -If BP remains elevated at next ff up appt, consider restarting Hydralazine at that time    Tobacco user  -smoking 1/2 ppd since age 21 y/o; recently quit smoking 3 weeks ago  -continue to encourage complete cessation     Health Maintenance  Colon cancer screening: no FIT or colonoscopy on file; will need at next visit  Lung Cancer screen: no lung screening on file, patient is a 27 pack year smoker   Mammogram: last MG done 7/2020 was BIRADS 1 (negative) bilaterally, will need on next appt  PapSmear: None on file  Hep C: consider 1 time screening on next visit  Dexa: None on file  Vaccines      Pneumonia: PPSV 23 done 11/2016, PCV 13 done 2/21/2018      Annual Flu:  offer when available      Zoster: 1 dose done 1/2014      Tdap: No record of Tdap on file        Follow up in 2 months  Patient would like to establish PCP care at [facility]  Needs to be re-established at [facility]  Request outside records from [facility]   Follow up status of home health on next appt   Repeat [facility] assessment on next visit; consider checking RPR at that time  With regards to Health maintenance, no hx of FIT or colonoscopy, no lung cancer screening done, no dexa on file, has not undergone 1 time hep c screening, needs shingrix #2 as well as Tdap       Barbara Stewart MD  Internal Medicine, PGY-2

## 2022-07-25 ENCOUNTER — TELEPHONE (OUTPATIENT)
Dept: FAMILY MEDICINE | Facility: CLINIC | Age: 77
End: 2022-07-25

## 2022-07-25 NOTE — TELEPHONE ENCOUNTER
Jori Jiménez,LPC with [facility] called and stated that they will not be able to take the patient back in the outpatient clinic do to her cognitive impairment.

## 2022-09-08 DIAGNOSIS — R41.89 COGNITIVE IMPAIRMENT: ICD-10-CM

## 2022-09-08 DIAGNOSIS — F05 ACUTE CONFUSIONAL STATE: Primary | ICD-10-CM

## 2022-09-08 RX ORDER — DONEPEZIL HYDROCHLORIDE 5 MG/1
5 TABLET, FILM COATED ORAL EVERY MORNING
Qty: 90 TABLET | Refills: 1 | Status: SHIPPED | OUTPATIENT
Start: 2022-09-08 | End: 2023-05-11 | Stop reason: SDUPTHER

## 2022-09-08 RX ORDER — DONEPEZIL HYDROCHLORIDE 5 MG/1
5 TABLET, FILM COATED ORAL EVERY MORNING
COMMUNITY
Start: 2022-08-15 | End: 2022-09-08 | Stop reason: SDUPTHER

## 2022-09-08 RX ORDER — ESCITALOPRAM OXALATE 5 MG/1
10 TABLET ORAL DAILY
Qty: 60 TABLET | Refills: 3 | Status: SHIPPED | OUTPATIENT
Start: 2022-09-08 | End: 2023-04-18 | Stop reason: SDUPTHER

## 2022-09-20 ENCOUNTER — OFFICE VISIT (OUTPATIENT)
Dept: FAMILY MEDICINE | Facility: CLINIC | Age: 77
End: 2022-09-20
Payer: COMMERCIAL

## 2022-09-20 VITALS
HEART RATE: 49 BPM | WEIGHT: 171.5 LBS | BODY MASS INDEX: 28.57 KG/M2 | TEMPERATURE: 98 F | SYSTOLIC BLOOD PRESSURE: 155 MMHG | OXYGEN SATURATION: 100 % | RESPIRATION RATE: 20 BRPM | DIASTOLIC BLOOD PRESSURE: 80 MMHG | HEIGHT: 65 IN

## 2022-09-20 DIAGNOSIS — R41.89 COGNITIVE IMPAIRMENT: ICD-10-CM

## 2022-09-20 DIAGNOSIS — R05.9 COUGH: Primary | ICD-10-CM

## 2022-09-20 DIAGNOSIS — D50.9 MICROCYTIC ANEMIA: ICD-10-CM

## 2022-09-20 DIAGNOSIS — I10 HYPERTENSION, UNSPECIFIED TYPE: ICD-10-CM

## 2022-09-20 DIAGNOSIS — J40 BRONCHITIS: ICD-10-CM

## 2022-09-20 DIAGNOSIS — Z87.891 HISTORY OF TOBACCO ABUSE: ICD-10-CM

## 2022-09-20 DIAGNOSIS — Z12.39 ENCOUNTER FOR SCREENING FOR MALIGNANT NEOPLASM OF BREAST, UNSPECIFIED SCREENING MODALITY: ICD-10-CM

## 2022-09-20 LAB
ANION GAP SERPL CALC-SCNC: 7 MEQ/L
BASOPHILS # BLD AUTO: 0.01 X10(3)/MCL (ref 0–0.2)
BASOPHILS NFR BLD AUTO: 0.2 %
BUN SERPL-MCNC: 7.2 MG/DL (ref 9.8–20.1)
CALCIUM SERPL-MCNC: 9.7 MG/DL (ref 8.4–10.2)
CHLORIDE SERPL-SCNC: 105 MMOL/L (ref 98–107)
CO2 SERPL-SCNC: 28 MMOL/L (ref 23–31)
CREAT SERPL-MCNC: 1.05 MG/DL (ref 0.55–1.02)
CREAT/UREA NIT SERPL: 7
EOSINOPHIL # BLD AUTO: 0.1 X10(3)/MCL (ref 0–0.9)
EOSINOPHIL NFR BLD AUTO: 2.4 %
ERYTHROCYTE [DISTWIDTH] IN BLOOD BY AUTOMATED COUNT: 18.5 % (ref 11.5–17)
FERRITIN SERPL-MCNC: 16.43 NG/ML (ref 4.63–204)
GFR SERPLBLD CREATININE-BSD FMLA CKD-EPI: 56 MLS/MIN/1.73/M2
GLUCOSE SERPL-MCNC: 95 MG/DL (ref 82–115)
HCT VFR BLD AUTO: 33.5 % (ref 37–47)
HGB BLD-MCNC: 10.4 GM/DL (ref 12–16)
IMM GRANULOCYTES # BLD AUTO: 0 X10(3)/MCL (ref 0–0.04)
IMM GRANULOCYTES NFR BLD AUTO: 0 %
IRON SATN MFR SERPL: 8 % (ref 20–50)
IRON SERPL-MCNC: 27 UG/DL (ref 50–170)
LYMPHOCYTES # BLD AUTO: 1.6 X10(3)/MCL (ref 0.6–4.6)
LYMPHOCYTES NFR BLD AUTO: 38.7 %
MCH RBC QN AUTO: 27.5 PG (ref 27–31)
MCHC RBC AUTO-ENTMCNC: 31 MG/DL (ref 33–36)
MCV RBC AUTO: 88.6 FL (ref 80–94)
MONOCYTES # BLD AUTO: 0.73 X10(3)/MCL (ref 0.1–1.3)
MONOCYTES NFR BLD AUTO: 17.7 %
NEUTROPHILS # BLD AUTO: 1.7 X10(3)/MCL (ref 2.1–9.2)
NEUTROPHILS NFR BLD AUTO: 41 %
NRBC BLD AUTO-RTO: 0 %
PLATELET # BLD AUTO: 306 X10(3)/MCL (ref 130–400)
PMV BLD AUTO: 9.9 FL (ref 7.4–10.4)
POTASSIUM SERPL-SCNC: 4.5 MMOL/L (ref 3.5–5.1)
RBC # BLD AUTO: 3.78 X10(6)/MCL (ref 4.2–5.4)
SARS-COV-2 RNA RESP QL NAA+PROBE: NOT DETECTED
SODIUM SERPL-SCNC: 140 MMOL/L (ref 136–145)
TIBC SERPL-MCNC: 300 UG/DL (ref 70–310)
TIBC SERPL-MCNC: 327 UG/DL (ref 250–450)
TRANSFERRIN SERPL-MCNC: 299 MG/DL (ref 173–360)
WBC # SPEC AUTO: 4.1 X10(3)/MCL (ref 4.5–11.5)

## 2022-09-20 PROCEDURE — 83540 ASSAY OF IRON: CPT | Performed by: FAMILY MEDICINE

## 2022-09-20 PROCEDURE — 82728 ASSAY OF FERRITIN: CPT | Performed by: FAMILY MEDICINE

## 2022-09-20 PROCEDURE — 87635 SARS-COV-2 COVID-19 AMP PRB: CPT | Performed by: FAMILY MEDICINE

## 2022-09-20 PROCEDURE — 36415 COLL VENOUS BLD VENIPUNCTURE: CPT | Performed by: FAMILY MEDICINE

## 2022-09-20 PROCEDURE — 85025 COMPLETE CBC W/AUTO DIFF WBC: CPT | Performed by: FAMILY MEDICINE

## 2022-09-20 PROCEDURE — 99214 OFFICE O/P EST MOD 30 MIN: CPT | Mod: PBBFAC | Performed by: FAMILY MEDICINE

## 2022-09-20 PROCEDURE — 80048 BASIC METABOLIC PNL TOTAL CA: CPT | Performed by: FAMILY MEDICINE

## 2022-09-20 RX ORDER — AMLODIPINE BESYLATE 10 MG/1
10 TABLET ORAL DAILY
Qty: 30 TABLET | Refills: 5 | Status: SHIPPED | OUTPATIENT
Start: 2022-09-20 | End: 2023-05-11 | Stop reason: SDUPTHER

## 2022-09-20 RX ORDER — AZITHROMYCIN 250 MG/1
TABLET, FILM COATED ORAL
Qty: 6 TABLET | Refills: 0 | Status: SHIPPED | OUTPATIENT
Start: 2022-09-20 | End: 2022-09-25

## 2022-09-20 RX ORDER — MEMANTINE HYDROCHLORIDE 5 MG/1
TABLET ORAL
Qty: 42 TABLET | Refills: 0 | OUTPATIENT
Start: 2022-09-20 | End: 2023-04-03

## 2022-09-20 NOTE — PATIENT INSTRUCTIONS
Will need prior home health company name, consider reinstating for medication management while tapering medications.     Will need to stop namenda (memantine) as the side effect can be low heart rate.   Take 5mg (need new tabs, cannot cut old tabs in half) twice a day for 2 weeks,   5mg once a day for 2 weeks, then stop.     We need to increase the dose of your Blood pressure medication Amlodipine, to 10mg daily.     Both prescriptions have been sent to the pharmacy [facility].       I will call your daughters to discuss your needs further.

## 2022-09-20 NOTE — PROGRESS NOTES
[facility] Geriatrics clinic    Subjective:        Dolores Ta is a 76 y.o. female who  has a past medical history of Hypertension. GERD, obesity, tobacco abuse who presents to  Geriatrics clinic today for follow up. Last office visit in 3/2022 for establishment of care with Geriatric clinic. Alysia (daughter) is POA. Patient's sister is present with patient today and assists in providing history.    Previous PCP was Dr. Nila Napier this is Ms. Ta's 2 visit with us.    Patient complains of headache located superolaterally on both her eyes which began this morning. She also complains of a sinus drip associated with a productive cough with whitish colored mucus that began yesterday evening.  Patient has a ~25 pack year hx (~50 yrs, 0.5 pack/daily), with cessation failed attempt since quitting last visit. She denies any fever, chills, blurring of vision, chest pain, shortness of breath, abdominal pain, nausea, vomiting, urinary or bowel issues.     Geriatrics Assessment (7/21/22)  BADLs: bathes independently, grooming and dressing independently, no continence issues, feeds independently  IADLs:  Daughter manages finances, still drives, goes shopping, no issues with meal preparation or laundry  Fall risk: last fall: reports 1 fall in the past 3 months, no LOC or head trauma  Labs: TSH wnl,  Cognition: SLUMS 7/30 (3/2022)  Mood: appropriate,  denies depressive symptoms  Polypharmacy: high risk medications: escitalopram, trazodone, donepezil   Social support: Children come to visit her regularly  Goals of care: daughter wants patient to continue living at home with support from family members as well as home health   Advance care preferences: power of - Alysia (daughter)   Nutrition/weight change: no weight loss, good appetite   Vision: No recent changes to vision; wears glasses  Hearing: No issues with hearing   Living situation: lives alone        Review of Systems:  10 point  "ROS negative except for HPI    Objective:   Vital Signs:  BP (!) 155/80 (BP Location: Left arm, Patient Position: Sitting, BP Method: Medium (Automatic))   Pulse (!) 49   Temp 98.4 °F (36.9 °C) (Oral)   Resp 20   Ht 5' 5" (1.651 m)   Wt 77.8 kg (171 lb 8.3 oz)   LMP  (LMP Unknown) Comment: Hysterectomy  SpO2 100%   BMI 28.54 kg/m²     Review of Systems   Constitutional:  Negative for chills, diaphoresis and fever.   HENT:  Positive for sinus pain. Negative for congestion, ear pain and sore throat.    Eyes:  Negative for pain and discharge.   Respiratory:  Positive for cough and sputum production. Negative for hemoptysis and shortness of breath.    Cardiovascular:  Negative for chest pain, orthopnea, claudication, leg swelling and PND.   Gastrointestinal:  Negative for abdominal pain, diarrhea, nausea and vomiting.     Physical Exam  Constitutional:       Appearance: Normal appearance.   HENT:      Head: Normocephalic and atraumatic.      Mouth/Throat:      Pharynx: No oropharyngeal exudate or posterior oropharyngeal erythema.   Eyes:      Extraocular Movements: Extraocular movements intact.      Pupils: Pupils are equal, round, and reactive to light.   Cardiovascular:      Rate and Rhythm: Normal rate and regular rhythm.      Pulses: Normal pulses.      Heart sounds: Normal heart sounds. No murmur heard.    No gallop.   Pulmonary:      Effort: Pulmonary effort is normal. No respiratory distress.      Breath sounds: Normal breath sounds. No stridor. No wheezing or rales.   Abdominal:      General: Abdomen is flat.      Palpations: Abdomen is soft.   Musculoskeletal:         General: No swelling or deformity. Normal range of motion.      Cervical back: Normal range of motion and neck supple. No rigidity.   Skin:     General: Skin is warm.      Capillary Refill: Capillary refill takes less than 2 seconds.      Coloration: Skin is not jaundiced.   Neurological:      General: No focal deficit present.      Mental " Status: She is alert.   Psychiatric:         Mood and Affect: Mood normal.         Thought Content: Thought content normal.         Judgment: Judgment normal.           Laboratory:  Lab Results   Component Value Date    WBC 4.6 06/16/2022    HGB 11.1 (L) 06/16/2022    HCT 35.2 (L) 06/16/2022     06/16/2022    MCV 83.6 06/16/2022    RDW 18.8 (H) 06/16/2022    Lab Results   Component Value Date     06/16/2022    K 4.8 06/16/2022    CO2 23 06/16/2022    BUN 9.9 06/16/2022    CREATININE 0.91 06/16/2022    CALCIUM 9.5 06/16/2022      Lab Results   Component Value Date    HGBA1C 6.2 10/30/2017     (H) 10/30/2017    CREATININE 0.91 06/16/2022    Lab Results   Component Value Date    TSH 1.4533 03/08/2022                  Current Medications:  Current Outpatient Medications   Medication Instructions    amLODIPine (NORVASC) 5 mg, Oral, Daily    donepeziL (ARICEPT) 5 mg, Oral, Every morning    EScitalopram oxalate (LEXAPRO) 10 mg, Oral, Daily    lisinopriL (PRINIVIL,ZESTRIL) 40 mg, Oral, Daily    memantine (NAMENDA) 10 mg, Oral, 2 times daily    traZODone (DESYREL) 50 mg, Oral, Nightly        Assessment and Plan:   Cognitive Impairment:  Bradycardia likely 2/2 AchE Inh Use  -SLUMS exam 3/24/22 7/30  -Repeat SLUMS assessment at next visit, consider checking RPR at this time, as it appears this was not completed as part of initial cognitive impairment workup   - Bradycardia exhibited likely related to use of Memantine and Donepezil    Anxiety  - Taking Lexapro and Trazodone  - Patient needs to be re-established at [facility]     HTN  - /80 today  - Continue lisinopril 40mg once daily  - Increased amlodipine 5 to 10 mg once daily  - Previously prescribed Hydralazine 25 BID, patient states that this was discontinued by her psychiatrist at [facility]  - If BP remains elevated at next ff up appt, consider restarting Hydralazine at that time    Tobacco user  - Patient began smoking again since quitting  last visit  - Quit date yesterday due to development of new cough  - Continue to encourage complete cessation     Health Maintenance  Colon cancer screening: no FIT or colonoscopy on file; will need at next visit  Lung Cancer screen: no lung screening on file, patient is a 27 pack year smoker   Mammogram: last MG done 7/2020 was BIRADS 1 (negative) bilaterally, will need on next appt  PapSmear: None on file  Hep C: consider 1 time screening on next visit  Dexa: None on file  Vaccines      Pneumonia: PPSV 23 done 11/2016, PCV 13 done 2/21/2018      Annual Flu: offer when available      Zoster: 1 dose done 1/2014      Tdap: No record of Tdap on file        Follow up in 4 weeks  Patient would like to establish PCP care at [facility]  Needs to be re-established at [facility]   Request outside records from [facility]   Follow up status of home health on next appt   Repeat [email or web address] assessment on next visit; consider checking RPR at that time  With regards to Health maintenance, no hx of FIT or colonoscopy, no lung cancer screening done, no dexa on file, has not undergone 1 time hep c screening, needs shingrix #2 as well as Tdap       Mark Cruz MD  \[facility]\"" Internal Medicine, HO-1

## 2022-10-04 NOTE — PROGRESS NOTES
Date of Service: 09/20/2022  Attending Attestation: Patient discussed with resident. The chart was reviewed thoroughly including pertinent vitals, labs, imaging, prior notes, and consultant/specialist recommendations.  I participated in the management of the patient, examined the patient, reviewed the summary of the plan, and was immediately available at all times throughout the encounter. Services were furnished in a primary care center located in the outpatient department of a teaching hospital. I agree with the resident's findings and plan as documented in the resident's note.    Summary of plan:  Heavy chronic smoker with productive cough and sinus headache/pain, rule out COVID w/ swab.  Monitor symptoms and treat symptomatically OTC  HTN - BP elevated today, off prior hydralazine, amlodipine increased today from 5mg, to 10mg daily  Bradycardia noted, attempt to taper namenda and aricept down, watch for behavior changes.   Patient sister and daughters helping with care and medications, patient still forgetting, needs prompting.      Nikole Gupta MD  Attending - Family Medicine / Geriatric Medicine  [facility]

## 2022-10-18 ENCOUNTER — OFFICE VISIT (OUTPATIENT)
Dept: FAMILY MEDICINE | Facility: CLINIC | Age: 77
End: 2022-10-18
Payer: COMMERCIAL

## 2022-10-18 VITALS
OXYGEN SATURATION: 100 % | TEMPERATURE: 98 F | RESPIRATION RATE: 20 BRPM | HEIGHT: 65 IN | WEIGHT: 172.19 LBS | SYSTOLIC BLOOD PRESSURE: 144 MMHG | HEART RATE: 56 BPM | DIASTOLIC BLOOD PRESSURE: 75 MMHG | BODY MASS INDEX: 28.69 KG/M2

## 2022-10-18 DIAGNOSIS — Z00.00 HEALTHCARE MAINTENANCE: Primary | ICD-10-CM

## 2022-10-18 DIAGNOSIS — K21.9 GASTROESOPHAGEAL REFLUX DISEASE, UNSPECIFIED WHETHER ESOPHAGITIS PRESENT: ICD-10-CM

## 2022-10-18 DIAGNOSIS — R41.89 COGNITIVE IMPAIRMENT: ICD-10-CM

## 2022-10-18 DIAGNOSIS — Z87.891 HISTORY OF TOBACCO ABUSE: ICD-10-CM

## 2022-10-18 DIAGNOSIS — I10 HYPERTENSION, UNSPECIFIED TYPE: ICD-10-CM

## 2022-10-18 PROCEDURE — 99215 OFFICE O/P EST HI 40 MIN: CPT | Mod: PBBFAC,25 | Performed by: FAMILY MEDICINE

## 2022-10-18 PROCEDURE — 90694 VACC AIIV4 NO PRSRV 0.5ML IM: CPT | Mod: PBBFAC

## 2022-10-18 PROCEDURE — 90677 PCV20 VACCINE IM: CPT | Mod: PBBFAC

## 2022-10-18 RX ORDER — ALBUTEROL SULFATE 90 UG/1
1-2 AEROSOL, METERED RESPIRATORY (INHALATION) 2 TIMES DAILY PRN
COMMUNITY
Start: 2022-03-24 | End: 2023-04-03 | Stop reason: SDUPTHER

## 2022-10-18 RX ORDER — ALBUTEROL SULFATE 90 UG/1
1-2 AEROSOL, METERED RESPIRATORY (INHALATION) EVERY 6 HOURS PRN
Qty: 18 G | Refills: 2 | OUTPATIENT
Start: 2022-10-18 | End: 2023-04-03

## 2022-10-18 NOTE — PROGRESS NOTES
[facility] Geriatrics clinic    Subjective:        Dolores Ta is a 76 y.o. female who  has a past medical history of Hypertension. GERD, obesity, tobacco abuse who presents to  Geriatrics clinic today for follow up. Last office visit in 9/20/2022. Reports her chronic R hand pain d/t  arthritis. Pt states she takes tylenol and ibuprofen for her pain. Arthritis is worse with cold weather. Previous note states Richmond (daughter) is POA, but this visit pt and daughter (Jovan Martínez) state that there is no POA, richmond just helps with finances. Patient's daughter is present with patient today and assists in providing history. Denies headaches, blurred vision, CP, SOB. Daughter states pt has improved mood and cognition, per family member.    Previous PCP was Dr. Nila Napier this is Ms. Ta's 3rd visit with us.    Last visit pt c/o headache with sinus congestion and productive cough, states she is feeling better but still having some congestion and cough due to her allergies.  Patient has a ~25 pack year hx (~50 yrs, 0.5 pack/daily), has prior cessation attempts, currently endorses 1-2 cigarettes a day.  She denies any fever, chills, blurring of vision, chest pain, shortness of breath, abdominal pain, nausea, vomiting, urinary or bowel issues.     Geriatrics Assessment (10/18/22)  BADLs: bathes independently, grooming and dressing independently, no continence issues, feeds independently. Has not forgotten to turn off stove/oven.  IADLs:  Daughter manages finances, still drives, goes shopping, no issues with meal preparation or laundry  Fall risk: last fall: reports 0 fall in the past 3 months,   Labs: TSH wnl,  Cognition: SLUMS 7/30 (3/2022)  Mood: appropriate,  denies depressive symptoms  Polypharmacy: high risk medications: escitalopram, trazodone, donepezil   Social support: Children come to visit her regularly  Goals of care: daughter wants patient to continue living at home with support  "from family members as well as home health   Advance care preferences:   No POA states richmond (daughter) just helps with finances.   Nutrition/weight change: no weight loss, good appetite   Vision: No recent changes to vision; wears glasses  Hearing: No issues with hearing   Living situation: lives alone    Geriatric Assessment:  Appetite: normal appetite  Sleep: no trouble sleeping  Mood: "my mood is good"  Falls/ambulation: no falls in past 3 months  Cognition: more alert and cognitively aware with less impulsivity, per daughter.     Bowel/Bladder:  normal. No change in frequency or habits.        Review of Systems:  10 point ROS negative except for HPI    Objective:   Vital Signs:  BP (!) 144/75 (BP Location: Left arm, Patient Position: Sitting, BP Method: Medium (Automatic))   Pulse (!) 56   Temp 98.4 °F (36.9 °C) (Oral)   Resp 20   Ht 5' 5" (1.651 m)   Wt 78.1 kg (172 lb 2.9 oz)   LMP  (LMP Unknown) Comment: Hysterectomy  SpO2 100%   BMI 28.65 kg/m²     Review of Systems   Constitutional:  Negative for chills, diaphoresis and fever.   HENT:  Negative for congestion, ear pain, sinus pain and sore throat.    Eyes:  Negative for pain and discharge.   Respiratory:  Negative for cough, hemoptysis, sputum production and shortness of breath.    Cardiovascular:  Negative for chest pain, orthopnea, claudication, leg swelling and PND.   Gastrointestinal:  Negative for abdominal pain, diarrhea, nausea and vomiting.   Musculoskeletal:         + right hand pain     Physical Exam  Constitutional:       Appearance: Normal appearance.   HENT:      Head: Normocephalic and atraumatic.      Mouth/Throat:      Pharynx: No oropharyngeal exudate or posterior oropharyngeal erythema.      Comments: Wears dentures  Eyes:      Extraocular Movements: Extraocular movements intact.      Pupils: Pupils are equal, round, and reactive to light.      Comments: Wears glasses   Cardiovascular:      Rate and Rhythm: Normal rate and " regular rhythm.      Pulses: Normal pulses.      Heart sounds: Normal heart sounds. No murmur heard.    No gallop.   Pulmonary:      Effort: Pulmonary effort is normal. No respiratory distress.      Breath sounds: Normal breath sounds. No stridor. No wheezing or rales.   Abdominal:      General: Abdomen is flat.      Palpations: Abdomen is soft.   Musculoskeletal:         General: Tenderness (dorsum of right hand.) present. No swelling or deformity. Normal range of motion.      Cervical back: Normal range of motion and neck supple. No rigidity.      Comments:  strength normal. NVIT   Skin:     General: Skin is warm.      Capillary Refill: Capillary refill takes less than 2 seconds.      Coloration: Skin is not jaundiced.   Neurological:      General: No focal deficit present.      Mental Status: She is alert.      Comments: Oriented to self and place.   Unsure of month and president, able to state the year   Psychiatric:         Mood and Affect: Mood normal.         Thought Content: Thought content normal.         Judgment: Judgment normal.           Laboratory:  Lab Results   Component Value Date    WBC 4.1 (L) 09/20/2022    HGB 10.4 (L) 09/20/2022    HCT 33.5 (L) 09/20/2022     09/20/2022    MCV 88.6 09/20/2022    RDW 18.5 (H) 09/20/2022    Lab Results   Component Value Date     09/20/2022    K 4.5 09/20/2022    CO2 28 09/20/2022    BUN 7.2 (L) 09/20/2022    CREATININE 1.05 (H) 09/20/2022    CALCIUM 9.7 09/20/2022      Lab Results   Component Value Date    HGBA1C 6.2 10/30/2017     (H) 10/30/2017    CREATININE 1.05 (H) 09/20/2022    Lab Results   Component Value Date    TSH 1.4533 03/08/2022                  Current Medications:  Current Outpatient Medications   Medication Instructions    albuterol (VENTOLIN HFA) 90 mcg/actuation inhaler 1-2 puffs, Inhalation, Every 6 hours PRN, Rescue    albuterol (VENTOLIN HFA) 90 mcg/actuation inhaler 1-2 puffs, Inhalation, 2 times daily PRN     amLODIPine (NORVASC) 10 mg, Oral, Daily    donepeziL (ARICEPT) 5 mg, Oral, Every morning    EScitalopram oxalate (LEXAPRO) 10 mg, Oral, Daily    lisinopriL (PRINIVIL,ZESTRIL) 40 mg, Oral, Daily    memantine (NAMENDA) 5 MG Tab Take 1 tablet (5 mg total) by mouth 2 (two) times daily for 14 days, THEN 1 tablet (5 mg total) once daily for 14 days. Then stop.    traZODone (DESYREL) 50 mg, Oral, Nightly        Assessment and Plan:   Cognitive Impairment:  Bradycardia likely 2/2 AchE Inh Use  -SLUMS exam 3/24/22 7/30  -Repeat SLUMS assessment at next visit, consider checking RPR at this time, as it appears this was not completed as part of initial cognitive impairment workup   - Bradycardia exhibited likely related to use of Memantine and Donepezil, will discontinue. Calling pharmacy to ensure this was stopped.     Anxiety  - Taking Lexapro and Trazodone    HTN  - /75 today  - Continue lisinopril 40mg once daily  - last visit Increased amlodipine 5 to 10 mg once daily, continue  - Previously prescribed Hydralazine 25 BID, patient states that this was discontinued by her [facility]      Tobacco user  - 1-2 cigarettes a day per patient.   - Quit date yesterday due to development of new cough  - Continue to encourage complete cessation     Arthritis  Worse right than left hand. Worse in cold and with repetitive motion.  strength normal. NV intact.   Occasionally uses OTC tylenol and ibuprofen with relief.     Asthma  States she needs refill of albuterol inhaler. Worse with cold medicine.   Ordered albuterol inhaler.    Health Maintenance  Colon cancer screening: no FIT or colonoscopy on file; ordered cologuard  Lung Cancer screen: no lung screening on file, patient is a 27 pack year smoker. LDCT ordered  Mammogram: last MG done 7/2020 was BIRADS 1 (negative) bilaterally, ordered  PapSmear: None on file  Hep C: consider 1 time screening on next visit  Dexa: ordered  Vaccines      Pneumonia: PPSV 23 done  11/2016, PCV 13 done 2/21/2018, pcv 20 today.      Annual Flu: given today      Zoster: 1 dose done 1/2014      Tdap: No record of Tdap on file        Follow up in 3 months  Patient would like to establish PCP care at [facility]  Repeat [facility] assessment on next visit; consider checking RPR at that time        Cl Samuel DO  [facility] Internal Medicine, HO-1

## 2022-10-26 ENCOUNTER — HOSPITAL ENCOUNTER (OUTPATIENT)
Dept: RADIOLOGY | Facility: HOSPITAL | Age: 77
Discharge: HOME | End: 2022-10-26
Payer: COMMERCIAL

## 2022-10-26 DIAGNOSIS — Z12.39 ENCOUNTER FOR SCREENING FOR MALIGNANT NEOPLASM OF BREAST, UNSPECIFIED SCREENING MODALITY: ICD-10-CM

## 2022-10-26 DIAGNOSIS — Z00.00 HEALTHCARE MAINTENANCE: ICD-10-CM

## 2022-10-26 PROCEDURE — 77080 DXA BONE DENSITY AXIAL: CPT | Mod: TC

## 2022-10-26 PROCEDURE — 77063 BREAST TOMOSYNTHESIS BI: CPT | Mod: 26,,, | Performed by: RADIOLOGY

## 2022-10-26 PROCEDURE — 77067 SCR MAMMO BI INCL CAD: CPT | Mod: TC

## 2022-10-26 PROCEDURE — 77067 SCR MAMMO BI INCL CAD: CPT | Mod: 26,,, | Performed by: RADIOLOGY

## 2022-10-26 PROCEDURE — 77067 MAMMO DIGITAL SCREENING BILAT WITH TOMO: ICD-10-PCS | Mod: 26,,, | Performed by: RADIOLOGY

## 2022-10-26 PROCEDURE — 77063 MAMMO DIGITAL SCREENING BILAT WITH TOMO: ICD-10-PCS | Mod: 26,,, | Performed by: RADIOLOGY

## 2022-11-08 DIAGNOSIS — I10 HYPERTENSION, UNSPECIFIED TYPE: Primary | ICD-10-CM

## 2022-11-09 RX ORDER — LISINOPRIL 40 MG/1
40 TABLET ORAL DAILY
Qty: 30 TABLET | Refills: 3 | Status: SHIPPED | OUTPATIENT
Start: 2022-11-09 | End: 2023-05-11 | Stop reason: SDUPTHER

## 2023-01-03 RX ORDER — TRAZODONE HYDROCHLORIDE 50 MG/1
50 TABLET ORAL NIGHTLY
Qty: 30 TABLET | Refills: 3 | Status: SHIPPED | OUTPATIENT
Start: 2023-01-03 | End: 2023-04-18 | Stop reason: SDUPTHER

## 2023-04-03 ENCOUNTER — HOSPITAL ENCOUNTER (EMERGENCY)
Facility: HOSPITAL | Age: 78
Discharge: HOME | End: 2023-04-03
Attending: STUDENT IN AN ORGANIZED HEALTH CARE EDUCATION/TRAINING PROGRAM
Payer: COMMERCIAL

## 2023-04-03 VITALS
HEIGHT: 65 IN | BODY MASS INDEX: 26.93 KG/M2 | OXYGEN SATURATION: 100 % | SYSTOLIC BLOOD PRESSURE: 156 MMHG | WEIGHT: 161.63 LBS | DIASTOLIC BLOOD PRESSURE: 94 MMHG | RESPIRATION RATE: 20 BRPM | HEART RATE: 55 BPM | TEMPERATURE: 98 F

## 2023-04-03 DIAGNOSIS — R53.83 FATIGUE, UNSPECIFIED TYPE: ICD-10-CM

## 2023-04-03 DIAGNOSIS — R41.89 COGNITIVE IMPAIRMENT: ICD-10-CM

## 2023-04-03 DIAGNOSIS — R00.1 SINUS BRADYCARDIA: Primary | ICD-10-CM

## 2023-04-03 DIAGNOSIS — Z76.0 ENCOUNTER FOR MEDICATION REFILL: ICD-10-CM

## 2023-04-03 LAB
ALBUMIN SERPL-MCNC: 3.5 G/DL (ref 3.4–4.8)
ALBUMIN/GLOB SERPL: 0.8 RATIO (ref 1.1–2)
ALP SERPL-CCNC: 90 UNIT/L (ref 40–150)
ALT SERPL-CCNC: 9 UNIT/L (ref 0–55)
AST SERPL-CCNC: 22 UNIT/L (ref 5–34)
BASOPHILS # BLD AUTO: 0.01 X10(3)/MCL (ref 0–0.2)
BASOPHILS NFR BLD AUTO: 0.2 %
BILIRUBIN DIRECT+TOT PNL SERPL-MCNC: 0.3 MG/DL
BUN SERPL-MCNC: 9.4 MG/DL (ref 9.8–20.1)
CALCIUM SERPL-MCNC: 9.7 MG/DL (ref 8.4–10.2)
CHLORIDE SERPL-SCNC: 108 MMOL/L (ref 98–107)
CO2 SERPL-SCNC: 24 MMOL/L (ref 23–31)
CREAT SERPL-MCNC: 0.87 MG/DL (ref 0.55–1.02)
EOSINOPHIL # BLD AUTO: 0.05 X10(3)/MCL (ref 0–0.9)
EOSINOPHIL NFR BLD AUTO: 1.2 %
ERYTHROCYTE [DISTWIDTH] IN BLOOD BY AUTOMATED COUNT: 16.1 % (ref 11.5–17)
GFR SERPLBLD CREATININE-BSD FMLA CKD-EPI: >60 MLS/MIN/1.73/M2
GLOBULIN SER-MCNC: 4.3 GM/DL (ref 2.4–3.5)
GLUCOSE SERPL-MCNC: 97 MG/DL (ref 82–115)
HCT VFR BLD AUTO: 35.1 % (ref 37–47)
HGB BLD-MCNC: 11.1 G/DL (ref 12–16)
IMM GRANULOCYTES # BLD AUTO: 0.01 X10(3)/MCL (ref 0–0.04)
IMM GRANULOCYTES NFR BLD AUTO: 0.2 %
LYMPHOCYTES # BLD AUTO: 1.4 X10(3)/MCL (ref 0.6–4.6)
LYMPHOCYTES NFR BLD AUTO: 34.6 %
MAGNESIUM SERPL-MCNC: 2 MG/DL (ref 1.6–2.6)
MCH RBC QN AUTO: 29 PG (ref 27–31)
MCHC RBC AUTO-ENTMCNC: 31.6 G/DL (ref 33–36)
MCV RBC AUTO: 91.6 FL (ref 80–94)
MONOCYTES # BLD AUTO: 0.64 X10(3)/MCL (ref 0.1–1.3)
MONOCYTES NFR BLD AUTO: 15.8 %
NEUTROPHILS # BLD AUTO: 1.94 X10(3)/MCL (ref 2.1–9.2)
NEUTROPHILS NFR BLD AUTO: 48 %
NRBC BLD AUTO-RTO: 0 %
PHOSPHATE SERPL-MCNC: 2.7 MG/DL (ref 2.3–4.7)
PLATELET # BLD AUTO: 306 X10(3)/MCL (ref 130–400)
PMV BLD AUTO: 9.4 FL (ref 7.4–10.4)
POTASSIUM SERPL-SCNC: 4.4 MMOL/L (ref 3.5–5.1)
PROT SERPL-MCNC: 7.8 GM/DL (ref 5.8–7.6)
RBC # BLD AUTO: 3.83 X10(6)/MCL (ref 4.2–5.4)
SODIUM SERPL-SCNC: 139 MMOL/L (ref 136–145)
TROPONIN I SERPL-MCNC: <0.01 NG/ML (ref 0–0.04)
TSH SERPL-ACNC: 0.85 UIU/ML (ref 0.35–4.94)
WBC # SPEC AUTO: 4.1 X10(3)/MCL (ref 4.5–11.5)

## 2023-04-03 PROCEDURE — 84484 ASSAY OF TROPONIN QUANT: CPT | Performed by: STUDENT IN AN ORGANIZED HEALTH CARE EDUCATION/TRAINING PROGRAM

## 2023-04-03 PROCEDURE — 93005 ELECTROCARDIOGRAM TRACING: CPT

## 2023-04-03 PROCEDURE — 85025 COMPLETE CBC W/AUTO DIFF WBC: CPT | Performed by: STUDENT IN AN ORGANIZED HEALTH CARE EDUCATION/TRAINING PROGRAM

## 2023-04-03 PROCEDURE — 84100 ASSAY OF PHOSPHORUS: CPT | Performed by: STUDENT IN AN ORGANIZED HEALTH CARE EDUCATION/TRAINING PROGRAM

## 2023-04-03 PROCEDURE — 80053 COMPREHEN METABOLIC PANEL: CPT | Performed by: STUDENT IN AN ORGANIZED HEALTH CARE EDUCATION/TRAINING PROGRAM

## 2023-04-03 PROCEDURE — 99285 EMERGENCY DEPT VISIT HI MDM: CPT | Mod: 25

## 2023-04-03 PROCEDURE — 84443 ASSAY THYROID STIM HORMONE: CPT | Performed by: STUDENT IN AN ORGANIZED HEALTH CARE EDUCATION/TRAINING PROGRAM

## 2023-04-03 PROCEDURE — 83735 ASSAY OF MAGNESIUM: CPT | Performed by: STUDENT IN AN ORGANIZED HEALTH CARE EDUCATION/TRAINING PROGRAM

## 2023-04-03 RX ORDER — ALBUTEROL SULFATE 90 UG/1
1-2 AEROSOL, METERED RESPIRATORY (INHALATION) EVERY 6 HOURS PRN
Qty: 18 G | Refills: 0 | Status: SHIPPED | OUTPATIENT
Start: 2023-04-03 | End: 2023-05-11 | Stop reason: SDUPTHER

## 2023-04-03 RX ORDER — LISINOPRIL 40 MG/1
40 TABLET ORAL DAILY
Qty: 30 TABLET | Refills: 0 | Status: SHIPPED | OUTPATIENT
Start: 2023-04-03 | End: 2023-05-03

## 2023-04-03 RX ORDER — MEMANTINE HYDROCHLORIDE 5 MG/1
5 TABLET ORAL DAILY
Qty: 30 TABLET | Refills: 0 | Status: SHIPPED | OUTPATIENT
Start: 2023-04-03 | End: 2023-05-16 | Stop reason: ALTCHOICE

## 2023-04-03 RX ORDER — DONEPEZIL HYDROCHLORIDE 5 MG/1
5 TABLET, FILM COATED ORAL NIGHTLY
Qty: 30 TABLET | Refills: 0 | Status: SHIPPED | OUTPATIENT
Start: 2023-04-03 | End: 2023-05-03

## 2023-04-03 NOTE — ED PROVIDER NOTES
"Encounter Date: 4/3/2023       History     Chief Complaint   Patient presents with    Medication Refill    Headache    Leg Pain     C/o needs blood pressure meds and has been "passing out". C/o headache and right leg and back pain. Bp 152/80     77-year-old female presents to ED for medication refill, fatigue and near syncope.  States several days ago she went to  her multiple medications from the pharmacy only 2 were available (trazodone and escitalopram).  She reports for the last several days feeling fatigued.  Denies any palpitations.  Initial triage states she passed out however to me she states she only got lightheaded and had to sit down before it resolved.  Denies any chest pain or pressure.  Requesting medication refill at this time.  Denies any fevers or chills, intact appetite, no nausea vomiting or diarrhea, no weight change. no other complaints or concerns at this time.    Review of patient's allergies indicates:  No Known Allergies  Past Medical History:   Diagnosis Date    Hypertension      History reviewed. No pertinent surgical history.  Family History   Problem Relation Age of Onset    Depression Mother     Sinusitis Mother     Diabetes Daughter      Social History     Tobacco Use    Smoking status: Some Days     Packs/day: 0.50     Types: Cigarettes     Passive exposure: Never    Smokeless tobacco: Former   Substance Use Topics    Alcohol use: Not Currently    Drug use: Never     Review of Systems   Constitutional:  Positive for fatigue. Negative for chills, diaphoresis and fever.   HENT:  Negative for congestion, rhinorrhea, sinus pain and sore throat.    Eyes:  Negative for pain, discharge and itching.   Respiratory:  Negative for cough, chest tightness and shortness of breath.    Cardiovascular:  Positive for palpitations. Negative for chest pain.   Gastrointestinal:  Negative for abdominal pain, nausea and vomiting.   Genitourinary:  Negative for dysuria, flank pain and " hematuria.   Musculoskeletal:  Negative for back pain and myalgias.   Skin:  Negative for color change and rash.   Neurological:  Positive for light-headedness. Negative for dizziness, weakness and headaches.   Psychiatric/Behavioral:  Negative for confusion. The patient is not hyperactive.      Physical Exam     Initial Vitals [04/03/23 1246]   BP Pulse Resp Temp SpO2   (!) 152/80 (!) 55 20 97.5 °F (36.4 °C) 100 %      MAP       --         Physical Exam    Vitals reviewed.  Constitutional: She appears well-developed and well-nourished. She is not diaphoretic. No distress.   HENT:   Head: Normocephalic and atraumatic.   Eyes: Conjunctivae and EOM are normal. Pupils are equal, round, and reactive to light.   Neck: Neck supple. No tracheal deviation present.   Normal range of motion.  Cardiovascular:  Regular rhythm, normal heart sounds and intact distal pulses.   Bradycardia present.         Pulmonary/Chest: Breath sounds normal. No respiratory distress.   Abdominal: Abdomen is soft. There is no abdominal tenderness. There is no rebound and no guarding.   Musculoskeletal:         General: Normal range of motion.      Cervical back: Normal range of motion and neck supple.     Neurological: She is alert and oriented to person, place, and time. She has normal strength. GCS score is 15. GCS eye subscore is 4. GCS verbal subscore is 5. GCS motor subscore is 6.   Skin: Skin is warm and dry. Capillary refill takes less than 2 seconds. No rash noted.   Psychiatric: She has a normal mood and affect. Her behavior is normal. Judgment and thought content normal.       ED Course   Procedures  Labs Reviewed   COMPREHENSIVE METABOLIC PANEL - Abnormal; Notable for the following components:       Result Value    Chloride 108 (*)     Blood Urea Nitrogen 9.4 (*)     Protein Total 7.8 (*)     Globulin 4.3 (*)     Albumin/Globulin Ratio 0.8 (*)     All other components within normal limits   CBC WITH DIFFERENTIAL - Abnormal; Notable for  the following components:    WBC 4.1 (*)     RBC 3.83 (*)     Hgb 11.1 (*)     Hct 35.1 (*)     MCHC 31.6 (*)     Neut # 1.94 (*)     All other components within normal limits   TROPONIN I - Normal   MAGNESIUM - Normal   PHOSPHORUS - Normal   TSH - Normal   CBC W/ AUTO DIFFERENTIAL    Narrative:     The following orders were created for panel order CBC auto differential.  Procedure                               Abnormality         Status                     ---------                               -----------         ------                     CBC with Differential[[id number]]        Abnormal            Final result                 Please view results for these tests on the individual orders.   EXTRA TUBES    Narrative:     The following orders were created for panel order EXTRA TUBES.  Procedure                               Abnormality         Status                     ---------                               -----------         ------                     Light Blue Top Hold[[id number]]                              In process                 Gold Top Hold[[id number]]                                    In process                   Please view results for these tests on the individual orders.   LIGHT BLUE TOP HOLD   GOLD TOP HOLD     EKG Readings: (Independently Interpreted)   Initial Reading: No STEMI. Rhythm: Sinus Bradycardia. Ectopy: No Ectopy. Conduction: Normal. Axis: Left Axis Deviation. Clinical Impression: Sinus Bradycardia Other Impression: Generalized T-wave flattening diffusely   ECG Results              EKG 12-lead (Final result)  Result time 04/04/23 15:41:49      Final result by Interface, Lab In [facility] (04/04/23 15:41:49)                   Narrative:    Test Reason : R55,    Vent. Rate : 048 BPM     Atrial Rate : 048 BPM     P-R Int : 196 ms          QRS Dur : 098 ms      QT Int : 476 ms       P-R-T Axes : 074 -33 018 degrees     QTc Int : 425 ms    Sinus bradycardia  Left axis deviation  Incomplete  right bundle branch block  Septal infarct ,age undetermined  Abnormal ECG  When compared with ECG of 20-JUN-2022 10:33,  Previous ECG has undetermined rhythm, needs review  Confirmed by Hernandez Omalley MD ([id number]) on 4/4/2023 3:41:41 PM    Referred By:             Confirmed By:Hernandez Omalley MD                                  Imaging Results              X-Ray Chest AP Portable (Final result)  Result time 04/03/23 13:48:13      Final result by Mark Kimball MD (04/03/23 13:48:13)                   Impression:      No acute findings in the chest      Electronically signed by: Mark Kimball MD  Date:    04/03/2023  Time:    13:48               Narrative:    EXAMINATION:  XR CHEST AP PORTABLE    CLINICAL HISTORY:  Chest pain, unspecified    COMPARISON:  12/21/2021    FINDINGS:  Single view of the chest shows no focal consolidation, pneumothorax or pleural effusion.  Cardiac silhouette is upper normal in size.  Pulmonary vasculature is normal.  Aorta is partially calcified.                                       Medications - No data to display  Medical Decision Making:   History:   Old Medical Records: I decided to obtain old medical records.  Initial Assessment:   Fatigue with lightheadedness and requesting medication refill  Differential Diagnosis:   Medication noncompliance  Medication overdosing, miscalculation  Sinus arrhythmia  Myocardial ischemia  Pulmonary embolism  Viral syndrome  COVID  Electrolyte derangement  Deconditioning  Orthostatics  Clinical Tests:   Lab Tests: Reviewed and Ordered  Radiological Study: Ordered and Reviewed  Medical Tests: Reviewed and Ordered  ED Management:  Patient in no acute distress.  She did not have an episode of syncope.  In department she had mild sinus bradycardia and was completely asymptomatic.  Had excellent distal perfusion.  EKG demonstrated incomplete bundle branch block and a sinus pattern.  No signs of acute ischemia.  Electrolytes grossly within normal range.  No significant  anemia, no evidence of kidney failure, no infectious etiology identified, etc.  Chest x-ray negative acute.  Patient requested multiple medications refilled and provides accurate list.  Refilled.  Requires close outpatient follow-up.  At this time does not mandate admission.  Strict return precautions provided and discharged stable. (Richard)                        Clinical Impression:   Final diagnoses:  [R00.1] Sinus bradycardia (Primary)  [Z76.0] Encounter for medication refill  [R53.83] Fatigue, unspecified type        ED Disposition Condition    Discharge Stable          ED Prescriptions       Medication Sig Dispense Start Date End Date Auth. Provider    albuterol (VENTOLIN HFA) 90 mcg/actuation inhaler Inhale 1-2 puffs into the lungs every 6 (six) hours as needed for Shortness of Breath or Wheezing. 18 g 4/3/2023 5/3/2023 Jerry Ramos MD    donepeziL (ARICEPT) 5 MG tablet Take 1 tablet (5 mg total) by mouth every evening. 30 tablet 4/3/2023 5/3/2023 Jerry Ramos MD    lisinopriL (PRINIVIL,ZESTRIL) 40 MG tablet Take 1 tablet (40 mg total) by mouth once daily. 30 tablet 4/3/2023 5/3/2023 Jerry Ramos MD    memantine (NAMENDA) 5 MG Tab Take 1 tablet (5 mg total) by mouth once daily. 30 tablet 4/3/2023 5/3/2023 Jerry Ramos MD          Follow-up Information       Follow up With Specialties Details Why Contact Info    [facility] Emergency Medicine  As needed, If symptoms worsen [address] [address] [phone]    Primary  Go in 1 week               Jerry Ramos MD  04/12/23 [id number]

## 2023-04-12 ENCOUNTER — HOSPITAL ENCOUNTER (OUTPATIENT)
Dept: RADIOLOGY | Facility: HOSPITAL | Age: 78
Discharge: HOME | End: 2023-04-12
Payer: COMMERCIAL

## 2023-04-12 DIAGNOSIS — R92.8 ABNORMAL MAMMOGRAM: ICD-10-CM

## 2023-04-12 PROCEDURE — 77065 MAMMO DIGITAL DIAGNOSTIC RIGHT WITH TOMO: ICD-10-PCS | Mod: 26,RT,, | Performed by: RADIOLOGY

## 2023-04-12 PROCEDURE — 77061 BREAST TOMOSYNTHESIS UNI: CPT | Mod: 26,RT,, | Performed by: RADIOLOGY

## 2023-04-12 PROCEDURE — 77061 MAMMO DIGITAL DIAGNOSTIC RIGHT WITH TOMO: ICD-10-PCS | Mod: 26,RT,, | Performed by: RADIOLOGY

## 2023-04-12 PROCEDURE — 77065 DX MAMMO INCL CAD UNI: CPT | Mod: 26,RT,, | Performed by: RADIOLOGY

## 2023-04-12 PROCEDURE — 77061 BREAST TOMOSYNTHESIS UNI: CPT | Mod: TC,RT

## 2023-04-19 RX ORDER — ESCITALOPRAM OXALATE 5 MG/1
10 TABLET ORAL DAILY
Qty: 60 TABLET | Refills: 3 | Status: SHIPPED | OUTPATIENT
Start: 2023-04-19 | End: 2023-05-11 | Stop reason: SDUPTHER

## 2023-04-19 RX ORDER — TRAZODONE HYDROCHLORIDE 50 MG/1
50 TABLET ORAL NIGHTLY
Qty: 30 TABLET | Refills: 3 | Status: SHIPPED | OUTPATIENT
Start: 2023-04-19 | End: 2023-05-11 | Stop reason: SDUPTHER

## 2023-04-26 ENCOUNTER — HOSPITAL ENCOUNTER (OUTPATIENT)
Dept: RADIOLOGY | Facility: HOSPITAL | Age: 78
Discharge: HOME | End: 2023-04-26
Payer: COMMERCIAL

## 2023-04-26 DIAGNOSIS — R92.1 BREAST CALCIFICATIONS: ICD-10-CM

## 2023-04-26 DIAGNOSIS — R92.1 BREAST CALCIFICATION, RIGHT: Primary | ICD-10-CM

## 2023-04-26 PROCEDURE — 19081 BX BREAST 1ST LESION STRTCTC: CPT

## 2023-04-26 PROCEDURE — 19081 BX BREAST 1ST LESION STRTCTC: CPT | Mod: RT,,, | Performed by: RADIOLOGY

## 2023-04-26 PROCEDURE — 27000552 MAMMO BREAST STEREOTACTIC BIOPSY 1ST SITE COMPLETE

## 2023-04-26 PROCEDURE — 19081 MAMMO BREAST STEREOTACTIC BIOPSY 1ST SITE COMPLETE: ICD-10-PCS | Mod: RT,,, | Performed by: RADIOLOGY

## 2023-04-26 PROCEDURE — 88305 TISSUE EXAM BY PATHOLOGIST: CPT | Mod: TC | Performed by: RADIOLOGY

## 2023-04-26 RX ORDER — LIDOCAINE HYDROCHLORIDE 20 MG/ML
INJECTION, SOLUTION EPIDURAL; INFILTRATION; INTRACAUDAL; PERINEURAL
Status: DISCONTINUED
Start: 2023-04-26 | End: 2023-04-27 | Stop reason: HOSPADM

## 2023-05-01 LAB
DHEA SERPL-MCNC: NORMAL
ESTROGEN SERPL-MCNC: NORMAL PG/ML
INSULIN SERPL-ACNC: NORMAL U[IU]/ML
LAB AP CLINICAL INFORMATION: NORMAL
LAB AP GROSS DESCRIPTION: NORMAL
LAB AP REPORT FOOTNOTES: NORMAL
T3RU NFR SERPL: NORMAL %

## 2023-05-08 ENCOUNTER — TELEPHONE (OUTPATIENT)
Dept: FAMILY MEDICINE | Facility: CLINIC | Age: 78
End: 2023-05-08

## 2023-05-08 DIAGNOSIS — R92.8 ABNORMAL MAMMOGRAM OF RIGHT BREAST: ICD-10-CM

## 2023-05-08 DIAGNOSIS — N60.21 SCLEROSING ADENOSIS OF RIGHT BREAST: Primary | ICD-10-CM

## 2023-05-08 NOTE — TELEPHONE ENCOUNTER
Per messages below from Dr. Banks, breast radiology,  Patient found to have atypical calcifications to right breast from prior mammogram, recent biopsy performed by Dr. Banks with pathology showing atypical adenosis and calcification but no evidence of malignancy.  He recommends breast clinic referral for closer monitoring of breast and short interval diagnostic mammograms.    Will place referral as recommended.     Nikole Gupta MD  Attending - Family Medicine / Geriatric Medicine  [facility], [facility]        ----- Message from Micah Banks MD sent at 5/5/2023  4:20 PM CDT -----  Regarding: FW: Atypia  Dr. Zimmer/Candy,    Please see below and refer to breast clinic. Thanks.      ----- Message -----  From: Mark Cruz MD  Sent: 5/3/2023   3:03 PM CDT  To: Micah Banks MD  Subject: RE: Atypia                                       This is not my patient. Please refer all orders/request to her primary care provider, Paola Zimmer MD or Nikole Gupta MD. I saw this patient one time in Geriatric clinic in September 2022.    Thank you,    Mark Cruz MD  [facility] Internal Medicine, HO-1      ----- Message -----  From: Micha Banks MD  Sent: 5/1/2023   4:33 PM CDT  To: Mark Cruz MD, Zhane Mistry MD, #  Subject: Atypia                                           Please see below.     Dr. Cruz, please refer the patient to breast clinic.    Thanks.              Pathology is now available for review, and demonstrates:  Stereotactic/tomosynthesis-guided core biopsy of right central to lower central breast regional calcifications, anterior to middle depth, spanning approximately 6.5 cm.  Hydromark butterfly-shaped biopsy clip is in appropriate position in the lower central right breast.  - Breast tissue with a focus of atypical apocrine adenosis, sclerosing adenosis and associated microcalcifications.  - No evidence of  malignancy.     Please see pathology report for full details. These pathology results are concordant with imaging findings.     Recommend referral to the [facility] breast clinic for consultation/management.  Should excision be deferred, recommend short-interval follow-up bilateral diagnostic mammogram in 10/2023.     Per the patient's request, the pathology results were discussed with the patient's daughter, Katie Shepherd (phone number: [phone]) by Dr. ANA MARÍA Banks on 05/01/2023 at approximately 4:27 p.m.

## 2023-05-11 DIAGNOSIS — I10 HYPERTENSION, UNSPECIFIED TYPE: ICD-10-CM

## 2023-05-11 DIAGNOSIS — R41.89 COGNITIVE IMPAIRMENT: ICD-10-CM

## 2023-05-11 DIAGNOSIS — F51.05 INSOMNIA DUE TO OTHER MENTAL DISORDER: ICD-10-CM

## 2023-05-11 DIAGNOSIS — F41.9 ANXIETY: Primary | ICD-10-CM

## 2023-05-11 DIAGNOSIS — F05 ACUTE CONFUSIONAL STATE: ICD-10-CM

## 2023-05-11 DIAGNOSIS — J41.0 SIMPLE CHRONIC BRONCHITIS: ICD-10-CM

## 2023-05-11 DIAGNOSIS — F99 INSOMNIA DUE TO OTHER MENTAL DISORDER: ICD-10-CM

## 2023-05-11 RX ORDER — ALBUTEROL SULFATE 90 UG/1
1-2 AEROSOL, METERED RESPIRATORY (INHALATION) EVERY 6 HOURS PRN
Qty: 1 G | Refills: 2 | Status: SHIPPED | OUTPATIENT
Start: 2023-05-11

## 2023-05-11 RX ORDER — ESCITALOPRAM OXALATE 10 MG/1
10 TABLET ORAL DAILY
Qty: 90 TABLET | Refills: 1 | Status: SHIPPED | OUTPATIENT
Start: 2023-05-11 | End: 2023-11-15 | Stop reason: SDUPTHER

## 2023-05-11 RX ORDER — AMLODIPINE BESYLATE 10 MG/1
10 TABLET ORAL DAILY
Qty: 90 TABLET | Refills: 1 | Status: SHIPPED | OUTPATIENT
Start: 2023-05-11 | End: 2023-06-22 | Stop reason: ALTCHOICE

## 2023-05-11 RX ORDER — LISINOPRIL 40 MG/1
40 TABLET ORAL DAILY
Qty: 90 TABLET | Refills: 1 | Status: SHIPPED | OUTPATIENT
Start: 2023-05-11 | End: 2024-01-29 | Stop reason: SDUPTHER

## 2023-05-11 RX ORDER — TRAZODONE HYDROCHLORIDE 50 MG/1
50 TABLET ORAL NIGHTLY
Qty: 90 TABLET | Refills: 1 | Status: SHIPPED | OUTPATIENT
Start: 2023-05-11 | End: 2024-01-29 | Stop reason: SDUPTHER

## 2023-05-11 RX ORDER — DONEPEZIL HYDROCHLORIDE 5 MG/1
5 TABLET, FILM COATED ORAL EVERY MORNING
Qty: 30 TABLET | Refills: 0 | Status: SHIPPED | OUTPATIENT
Start: 2023-05-11 | End: 2023-05-16 | Stop reason: ALTCHOICE

## 2023-05-11 RX ORDER — MEMANTINE HYDROCHLORIDE 5 MG/1
5 TABLET ORAL DAILY
Qty: 90 TABLET | Refills: 1 | OUTPATIENT
Start: 2023-05-11

## 2023-05-16 ENCOUNTER — OFFICE VISIT (OUTPATIENT)
Dept: FAMILY MEDICINE | Facility: CLINIC | Age: 78
End: 2023-05-16
Payer: COMMERCIAL

## 2023-05-16 VITALS
WEIGHT: 159.63 LBS | RESPIRATION RATE: 20 BRPM | DIASTOLIC BLOOD PRESSURE: 86 MMHG | SYSTOLIC BLOOD PRESSURE: 164 MMHG | OXYGEN SATURATION: 100 % | TEMPERATURE: 99 F | HEART RATE: 61 BPM | HEIGHT: 65 IN | BODY MASS INDEX: 26.6 KG/M2

## 2023-05-16 DIAGNOSIS — R41.89 COGNITIVE IMPAIRMENT: ICD-10-CM

## 2023-05-16 DIAGNOSIS — I10 HYPERTENSION, UNSPECIFIED TYPE: ICD-10-CM

## 2023-05-16 DIAGNOSIS — D50.9 IRON DEFICIENCY ANEMIA, UNSPECIFIED IRON DEFICIENCY ANEMIA TYPE: Primary | ICD-10-CM

## 2023-05-16 DIAGNOSIS — N60.21 SCLEROSING ADENOSIS OF RIGHT BREAST: ICD-10-CM

## 2023-05-16 LAB
FERRITIN SERPL-MCNC: 39.73 NG/ML (ref 4.63–204)
IRON SATN MFR SERPL: 15 % (ref 20–50)
IRON SERPL-MCNC: 44 UG/DL (ref 50–170)
TIBC SERPL-MCNC: 250 UG/DL (ref 70–310)
TIBC SERPL-MCNC: 294 UG/DL (ref 250–450)
TRANSFERRIN SERPL-MCNC: 253 MG/DL (ref 173–360)

## 2023-05-16 PROCEDURE — 99214 OFFICE O/P EST MOD 30 MIN: CPT | Mod: PBBFAC

## 2023-05-16 PROCEDURE — 83550 IRON BINDING TEST: CPT

## 2023-05-16 PROCEDURE — 36415 COLL VENOUS BLD VENIPUNCTURE: CPT

## 2023-05-16 PROCEDURE — 82728 ASSAY OF FERRITIN: CPT

## 2023-05-16 NOTE — PATIENT INSTRUCTIONS
FOR BLOOD PRESSURE -     ONLY TAKE LISINOPRIL FOR NOW    TAKE MORNING MEDICATION.    CHECK THE BLOOD PRESSURE BETWEEN 2 - 4 HOURS AFTER THE MEDICATION (BEFORE LUNCH IS GOOD)    WRITE IT DOWN (BOTH THE BLOOD PRESSURE AND THE PULSE)  ON THE LOG SHEET ONCE EVERY DAY AND BRING THAT PAPER TO THE NEXT APPOINTMENT.     STOP MEMORY MEDICATIONS - BOTH DONEPEZIL AND MEMANTINE - THOSE ARE NOT GOING TO BE INCLUDED IN THE PACK

## 2023-05-18 RX ORDER — MEMANTINE HYDROCHLORIDE 5 MG/1
5 TABLET ORAL DAILY
Qty: 90 TABLET | Refills: 1 | OUTPATIENT
Start: 2023-05-18 | End: 2023-06-17

## 2023-05-30 ENCOUNTER — OFFICE VISIT (OUTPATIENT)
Dept: FAMILY MEDICINE | Facility: CLINIC | Age: 78
End: 2023-05-30
Payer: COMMERCIAL

## 2023-05-30 VITALS
HEART RATE: 62 BPM | RESPIRATION RATE: 18 BRPM | WEIGHT: 157 LBS | TEMPERATURE: 99 F | SYSTOLIC BLOOD PRESSURE: 130 MMHG | OXYGEN SATURATION: 99 % | HEIGHT: 65 IN | DIASTOLIC BLOOD PRESSURE: 74 MMHG | BODY MASS INDEX: 26.16 KG/M2

## 2023-05-30 DIAGNOSIS — R41.89 COGNITIVE IMPAIRMENT: ICD-10-CM

## 2023-05-30 DIAGNOSIS — I10 PRIMARY HYPERTENSION: Primary | ICD-10-CM

## 2023-05-30 DIAGNOSIS — D50.9 IRON DEFICIENCY ANEMIA, UNSPECIFIED IRON DEFICIENCY ANEMIA TYPE: ICD-10-CM

## 2023-05-30 PROCEDURE — 99214 OFFICE O/P EST MOD 30 MIN: CPT | Mod: PBBFAC

## 2023-05-30 NOTE — PROGRESS NOTES
"  [facility]    DOS: 5/16/2023      Subjective:  Chief Complaint:    Chief Complaint   Patient presents with    Follow-up     Pain in right arm    Hypertension       History of Present Illness:  Dolores Ta is a 77 y.o. female with a PMH of HTN, Depression, Dementia, insomnia, GERD, chronic smoker with possible undiagnosed COPD, allergic rhinitis,     Who presents today for:  Follow up of Chronic Conditions: dementia     Patient's daughter Phill brings patient in as she has not had refills on several medications for > 1 month and therefore not taking anything, and was concerned.   Refills were given few days prior, but patient had to be coerced into coming back in, had not been seen since October prior to this.  Daughter that normally manages her medications Katie, was available by phone and verified the same. Daughters state patient has been compliant with meds as daughters both monitor to make sure she is taking.      Patient states she feels well overall with good appetite, maintaining well.      Blood pressure mildly elevated 150s sys, daughters state she had just taken the meds right before the appointment, so instructions given to take BP log at home.  Despite not taking either of the meds, BP only in 150s, concern that both amlodipine and lisinopril may not be needed at highest dose.  Will maintain lisinopril only for now and monitor BP and reintroduce amlodipine slowly if needed at next visit.     Labs reviewed from prior, notable for anemia 11/1/35.1 in April- patient has been Hb 10-11 for past 2 years, but no recent iron studies, so will obtain today.     Breast mammogram in April showing dense tissue and abnormal calcifications, biopsy done in 5/1 showing " atypical apocrine adenosis, sclerosing adenosis and associated microcalcifications" per pathology but no evidence of malignancy.  Interventional radiology still recommended referral for " breast clinic for closer follow up and surveillance, so referral was previously placed, patient pending that appointment.      Past Medical History:   Diagnosis Date    Hypertension       Past Surgical History:   Procedure Laterality Date    BREAST BIOPSY Right     HYSTERECTOMY        Family History   Problem Relation Age of Onset    Depression Mother     Sinusitis Mother     Diabetes Daughter       Social History     Socioeconomic History    Marital status:     Number of children: 6   Occupational History    Occupation: Retired   Tobacco Use    Smoking status: Some Days     Packs/day: 0.50     Types: Cigarettes     Passive exposure: Never    Smokeless tobacco: Former   Substance and Sexual Activity    Alcohol use: Not Currently    Drug use: Never    Sexual activity: Not Currently     Social Determinants of Health     Financial Resource Strain: Low Risk     Difficulty of Paying Living Expenses: Not hard at all   Food Insecurity: No Food Insecurity    Worried About Running Out of Food in the Last Year: Never true    Ran Out of Food in the Last Year: Never true   Transportation Needs: No Transportation Needs    Lack of Transportation (Medical): No    Lack of Transportation (Non-Medical): No   Physical Activity: Sufficiently Active    Days of Exercise per Week: 7 days    Minutes of Exercise per Session: 30 min   Stress: No Stress Concern Present    Feeling of Stress : Not at all   Social Connections: Moderately Isolated    Frequency of Communication with Friends and Family: More than three times a week    Frequency of Social Gatherings with Friends and Family: More than three times a week    Attends [employer] Services: More than 4 times per year    Active Member of Clubs or Organizations: No    Attends Club or Organization Meetings: Never    Marital Status:    Housing Stability: Low Risk     Unable to Pay for Housing in the Last Year: No    Number of Places Lived in the Last Year: 1    Unstable Housing in  "the Last Year: No        Review of patient's allergies indicates:  No Known Allergies     Current Outpatient Medications:     albuterol (VENTOLIN HFA) 90 mcg/actuation inhaler, Inhale 1-2 puffs into the lungs every 6 (six) hours as needed for Shortness of Breath or Wheezing., Disp: 1 g, Rfl: 2    amLODIPine (NORVASC) 10 MG tablet, Take 1 tablet (10 mg total) by mouth once daily., Disp: 90 tablet, Rfl: 1    EScitalopram oxalate (LEXAPRO) 10 MG tablet, Take 1 tablet (10 mg total) by mouth once daily., Disp: 90 tablet, Rfl: 1    lisinopriL (PRINIVIL,ZESTRIL) 40 MG tablet, Take 1 tablet (40 mg total) by mouth once daily., Disp: 90 tablet, Rfl: 1    traZODone (DESYREL) 50 MG tablet, Take 1 tablet (50 mg total) by mouth every evening., Disp: 90 tablet, Rfl: 1     Review of Systems:  Review of Systems   Constitutional:  Negative for chills, fever and weight loss.   HENT:  Negative for congestion, hearing loss and sore throat.    Eyes:  Negative for blurred vision.   Respiratory:  Negative for cough, sputum production and shortness of breath.    Cardiovascular:  Negative for chest pain, palpitations and leg swelling.   Gastrointestinal:  Negative for abdominal pain, constipation, diarrhea, heartburn, nausea and vomiting.   Genitourinary:  Negative for dysuria, frequency and urgency.   Musculoskeletal:  Negative for back pain, falls, joint pain and myalgias.   Neurological:  Negative for dizziness, focal weakness, weakness and headaches.   Psychiatric/Behavioral:  Positive for depression and memory loss. The patient has insomnia. The patient is not nervous/anxious.       Objective:   Vitals:    05/16/23 1039 05/16/23 1137   BP: (!) 158/88 (!) 164/86   BP Location: Left arm Left arm   Patient Position: Sitting Sitting   BP Method: Large (Automatic) Large (Manual)   Pulse: 61    Resp: 20    Temp: 98.5 °F (36.9 °C)    TempSrc: Oral    SpO2: 100%    Weight: 72.4 kg (159 lb 9.8 oz)    Height: 5' 5" (1.651 m)         Physical " Exam  Vitals reviewed.   Constitutional:       General: She is not in acute distress.     Appearance: Normal appearance.   HENT:      Head: Normocephalic and atraumatic.      Right Ear: External ear normal.      Left Ear: External ear normal.      Nose: Nose normal. No congestion or rhinorrhea.      Mouth/Throat:      Mouth: Mucous membranes are moist.      Pharynx: Oropharynx is clear. No oropharyngeal exudate or posterior oropharyngeal erythema.   Eyes:      Extraocular Movements: Extraocular movements intact.      Conjunctiva/sclera: Conjunctivae normal.      Pupils: Pupils are equal, round, and reactive to light.   Cardiovascular:      Rate and Rhythm: Normal rate and regular rhythm.      Pulses: Normal pulses.      Heart sounds: Normal heart sounds. No murmur heard.  Pulmonary:      Effort: Pulmonary effort is normal. No respiratory distress.      Breath sounds: Normal breath sounds. No wheezing or rhonchi.   Abdominal:      General: Abdomen is flat. There is no distension.      Palpations: Abdomen is soft.      Tenderness: There is no abdominal tenderness.   Musculoskeletal:         General: Normal range of motion.      Cervical back: Normal range of motion and neck supple.      Right lower leg: No edema.      Left lower leg: No edema.   Skin:     General: Skin is warm and dry.   Neurological:      General: No focal deficit present.      Mental Status: She is alert and oriented to person, place, and time. Mental status is at baseline.   Psychiatric:         Mood and Affect: Mood normal.         Behavior: Behavior normal.         Thought Content: Thought content normal.         Judgment: Judgment normal.        Recent labs:  CBC:  Lab Results   Component Value Date    WBC 4.1 (L) 04/03/2023    RBC 3.83 (L) 04/03/2023    HGB 11.1 (L) 04/03/2023    HCT 35.1 (L) 04/03/2023    MCV 91.6 04/03/2023    MCH 29.0 04/03/2023    MCHC 31.6 (L) 04/03/2023    RDW 16.1 04/03/2023     04/03/2023    MPV 9.4 04/03/2023       CMP:  Sodium Level   Date Value Ref Range Status   04/03/2023 139 136 - 145 mmol/L Final     Potassium Level   Date Value Ref Range Status   04/03/2023 4.4 3.5 - 5.1 mmol/L Final     Carbon Dioxide   Date Value Ref Range Status   04/03/2023 24 23 - 31 mmol/L Final     Blood Urea Nitrogen   Date Value Ref Range Status   04/03/2023 9.4 (L) 9.8 - 20.1 mg/dL Final     Creatinine   Date Value Ref Range Status   04/03/2023 0.87 0.55 - 1.02 mg/dL Final     Calcium Level Total   Date Value Ref Range Status   04/03/2023 9.7 8.4 - 10.2 mg/dL Final     Albumin Level   Date Value Ref Range Status   04/03/2023 3.5 3.4 - 4.8 g/dL Final     Bilirubin Total   Date Value Ref Range Status   04/03/2023 0.3 <=1.5 mg/dL Final     Alkaline Phosphatase   Date Value Ref Range Status   04/03/2023 90 40 - 150 unit/L Final     Aspartate Aminotransferase   Date Value Ref Range Status   04/03/2023 22 5 - 34 unit/L Final     Alanine Aminotransferase   Date Value Ref Range Status   04/03/2023 9 0 - 55 unit/L Final     Estimated GFR-Non    Date Value Ref Range Status   03/08/2022 >60        BMP:  Lab Results   Component Value Date     04/03/2023    K 4.4 04/03/2023    CO2 24 04/03/2023    BUN 9.4 (L) 04/03/2023    CREATININE 0.87 04/03/2023    CALCIUM 9.7 04/03/2023    EGFRNONAA >60 03/08/2022      Lipid Panel:  Lab Results   Component Value Date    CHOL 169 10/30/2017     Lab Results   Component Value Date    HDL 52 10/30/2017     No results found for: LDLCALC  Lab Results   Component Value Date    TRIG 75 10/30/2017     No results found for: CHOLHDL   HbA1c:  Lab Results   Component Value Date    HGBA1C 6.2 10/30/2017      TSH:  Lab Results   Component Value Date    TSH 0.851 04/03/2023       Recent Imaging:  Mammo Breast Stereotactic Biopsy 1st sit   - MAMMO BREAST STEREOTACTIC BIOPSY 1ST SITE COMPLETE    DIGITAL TOMOGRAPHIC MAMMOGRAPHY GUIDED STEREOTACTIC GUIDED BIOPSY RIGHT BREAST WITH MARKING DEVICE INSERTED AND POST  DIGITAL MAMMOGRAPHIC IMAGIN2023  Stereotactic/Tomosynthesis-Guided Biopsy: Vacuum Assisted Biopsy with Post Biopsy Clip Placement:  2023       COMPARISON:  2023 and 10/26/2022       CLINICAL HISTORY:  77-year-old woman presents for stereotactic/tomosynthesis-guided core biopsy of right central to lower central breast regional calcifications, anterior to middle depth, spanning approximately 6.5 cm.      TECHNIQUE:  Procedure was explained in detail to patient.  Risks (including risks of pain, infection, bleeding, non-recovery of targeted lesion, possible need for further intervention for diagnostic purposes), intent to place post-biopsy marking clip, benefits, and alternatives discussed. All questions were answered.  Written and verbal informed consent obtained.      Under semi-sterile conditions, the calcifications were re-identified in the lower central location of the right breast using computer-assisted tomosynthesis/stereotactic imaging in the LM projection.   Following administration of 1% Lidocaine with dilute epinephrine, a small dermatotomy was made in the skin with a scalpel and a 9 G vacuum assisted needle was introduced to the site of the calcifications. The needle was placed through the calcifications under tomosynthesis/stereotactic guidance. A total of 6 biopsy samples were obtained. Specimen radiograph demonstrated the presence of calcifications within the samples. A hydromark butterfly-shaped post-biopsy marking clip was placed with confirmation of its deployment.    Post placement digital mammographic imaging was obtained then interpreted on a dedicated mammography workstation. Subsequent right mammogram demonstrates the presence of the hydromark butterfly-shaped biopsy clip to be in the appropriate location in the lower central right breast.    The patient tolerated the procedure well and left the department in satisfactory condition with instructions that if excessive oozing or  bleeding should occur, she should contact her health care provider or visit an emergency room. Results should be available within 7 working days.    IMPRESSION: STEREOTACTIC GUIDED BIOPSY HIGH RISK BENIGN   1. Stereotactic/tomosynthesis-guided core biopsy of right central to lower central breast regional calcifications, anterior to middle depth, spanning approximately 6.5 cm.  Hydromark butterfly-shaped biopsy clip is in appropriate position in the lower central right breast.    2. Pathology results pending.    Pathology is now available for review, and demonstrates:  Stereotactic/tomosynthesis-guided core biopsy of right central to lower central breast regional calcifications, anterior to middle depth, spanning approximately 6.5 cm.  Hydromark butterfly-shaped biopsy clip is in appropriate position in the lower central right breast.  - Breast tissue with a focus of atypical apocrine adenosis, sclerosing adenosis and associated microcalcifications.  - No evidence of malignancy.     Please see pathology report for full details. These pathology results are concordant with imaging findings.     Recommend referral to the [facility] breast clinic for consultation/management.  Should excision be deferred, recommend short-interval follow-up bilateral diagnostic mammogram in 10/2023.     Per the patient's request, the pathology results were discussed with the patient's daughter, Katie Shepherd (phone number: [phone]) by Dr. ANA MARÍA Banks on 05/01/2023 at approximately 4:27 p.m.    Micah Banks M.D.    lm/:5/1/2023 16:33:20       Assessment & Plan:    Dolores Ta is presenting as above and will be treated as follows:    Dolores was seen today for follow-up and hypertension.    Diagnoses and all orders for this visit:    Iron deficiency anemia, unspecified iron deficiency anemia type  -     Iron and TIBC; Future  -     Ferritin; Future  -     Ferritin  -     Iron and TIBC    Sclerosing adenosis of right breast  - patient  referred to breast clinic for closer follow up per radiologist recommendation    Hypertension, unspecified type  - Keep BP log 3-4 hours after taking AM lisinopril only, hold amlodipine for now  Bring log to next appointment    Cognitive impairment  - patient with increased supervision from daughters for medication compliance     RTC in 2 weeks for BP check and lab results     Health Maintenance Reviewed:  Immunization History   Administered Date(s) Administered    Influenza (FLUAD) - Quadrivalent - Adjuvanted - PF *Preferred* (65+) 10/18/2022    Pneumococcal Conjugate - 20 Valent 10/18/2022        Nikole Gupta MD  Attending - Family Medicine / Geriatric Medicine  [facility] [place]

## 2023-05-30 NOTE — PATIENT INSTRUCTIONS
RESTART IRON PILLS 1 TABLET EVERY OTHER DAY    CONTINUE LISINOPRIL ONLY FOR BLOOD PRESSURE - NO AMLODIPINE    CONTINUE WALKING - STAY HYDRATED, DONT GO OUT ALONE, IF ITS TOO HOT, OR WITHOUT TELLING SOMEONE

## 2023-06-08 ENCOUNTER — OFFICE VISIT (OUTPATIENT)
Dept: SURGERY | Facility: CLINIC | Age: 78
End: 2023-06-08
Payer: COMMERCIAL

## 2023-06-08 VITALS
HEART RATE: 61 BPM | BODY MASS INDEX: 25.79 KG/M2 | SYSTOLIC BLOOD PRESSURE: 122 MMHG | DIASTOLIC BLOOD PRESSURE: 72 MMHG | OXYGEN SATURATION: 100 % | WEIGHT: 155 LBS | TEMPERATURE: 99 F

## 2023-06-08 DIAGNOSIS — N60.21 SCLEROSING ADENOSIS OF RIGHT BREAST: ICD-10-CM

## 2023-06-08 DIAGNOSIS — R92.8 ABNORMAL MAMMOGRAM OF RIGHT BREAST: ICD-10-CM

## 2023-06-08 PROCEDURE — 99214 OFFICE O/P EST MOD 30 MIN: CPT | Mod: PBBFAC

## 2023-06-08 NOTE — PROGRESS NOTES
[facility] General Surgery Clinic Note    HPI:   Patient is a 77F w/Pmhx of depression and anxiety, who presents today for biopsy findings of BIRADS 4 microcalcification on mammogram with pathology showing sclerosing adenosis with atypical apocrine adenosis. Patient denies any symptoms of breast mass, nipple discharge, nipple retraction, or skin changes. She has not had previous biopsies. Patient reports yearly mammograms. Menses started at age 14. First child was at age 18. Menopause was at age 62. She denies any adenopathy.    PMH:   Past Medical History:   Diagnosis Date    Hypertension       Meds:   Current Outpatient Medications:     albuterol (VENTOLIN HFA) 90 mcg/actuation inhaler, Inhale 1-2 puffs into the lungs every 6 (six) hours as needed for Shortness of Breath or Wheezing., Disp: 1 g, Rfl: 2    amLODIPine (NORVASC) 10 MG tablet, Take 1 tablet (10 mg total) by mouth once daily., Disp: 90 tablet, Rfl: 1    EScitalopram oxalate (LEXAPRO) 10 MG tablet, Take 1 tablet (10 mg total) by mouth once daily., Disp: 90 tablet, Rfl: 1    lisinopriL (PRINIVIL,ZESTRIL) 40 MG tablet, Take 1 tablet (40 mg total) by mouth once daily., Disp: 90 tablet, Rfl: 1    traZODone (DESYREL) 50 MG tablet, Take 1 tablet (50 mg total) by mouth every evening., Disp: 90 tablet, Rfl: 1  Allergies: Review of patient's allergies indicates:  No Known Allergies  Social History:   Social History     Tobacco Use    Smoking status: Some Days     Packs/day: 0.50     Types: Cigarettes     Passive exposure: Never    Smokeless tobacco: Former   Substance Use Topics    Alcohol use: Not Currently    Drug use: Never     Family History:   Family History   Problem Relation Age of Onset    Depression Mother     Sinusitis Mother     Diabetes Daughter      Surgical History:   Past Surgical History:   Procedure Laterality Date    BREAST BIOPSY Right     HYSTERECTOMY       Review of Systems:  Negative except for HPI    Objective:    Vitals:  Vitals:    06/08/23  1348   BP: 122/72   Pulse: 61   Temp: 98.6 °F (37 °C)        Physical Exam:  Gen: NAD  Neuro: awake, alert, answering questions appropriately  CV: RRR  Resp: non-labored breathing, BRONSON  Abd: soft, ND, NT  Ext: moves all 4 spontaneously and purposefully  Skin: warm, well perfused  Breast: No masses, no adenopathy, no nipple discharge or retraction, no skin changes      Micro/Path/Other:     Right central to lower central breast calcifications, anterior to middle depth, Stereotactic-guided core biopsy:   - Breast tissue with a focus of atypical apocrine adenosis, sclerosing adenosis and associated microcalcifications.  - No evidence of malignancy.      Assessment/Plan:  Patient is a 77F w/Pmhx of depression and anxiety, who presents today for biopsy findings of BIRADS 4 microcalcification on mammogram with pathology showing sclerosing adenosis with atypical apocrine adenosis.  - Patient would like to further discuss surgery with family  - RTC in 2 weeks    Tash Betancourt MD   [employer] General Surgery PGY 3  06/08/2023 2:37 PM

## 2023-06-08 NOTE — PROGRESS NOTES
Patient seen by Dr. BILLY Betancourt will return in 2 weeks to possible discuss surgery. Written and verbal discharge instructions given.

## 2023-06-09 NOTE — PROGRESS NOTES
I have reviewed the notes, assessments, and/or procedures performed by the resident, I concur with her/his documentation of Dolores Ta.     Zhane Mistry MD

## 2023-06-22 NOTE — PROGRESS NOTES
[facility]  [facility]    DOS: 5/30/2023      Subjective:  Chief Complaint:    Chief Complaint   Patient presents with    Follow-up    Hypertension     C/O dizziness upon raising in the am.       History of Present Illness:  Dolores Ta is a 77 y.o. female with a PMH of HTN, Depression, Dementia, insomnia, GERD, chronic smoker with possible undiagnosed COPD, allergic rhinitis,     Who presents today for:  Follow up of Chronic Conditions: HTN - BP check follow up    Patient's daughter Phill brings patient in again, follow up for HTN as medication changes from last visit.   Patient left BP log at home, but patient and daughter state that blood pressures have been good 120-130s systolic range at home on just lisinopril.  Denies headaches, acute vision changes, chest pain, SOB/DANIEL.    Patient has continued to be compliant and doing well with medications, and in good spirits in appointment today.  Patient notes she has been physically active with walking in her neighborhood. We discussed brain stimulating activities as well during the day such as reading, chess, checkers, puzzles, word search which patient enjoys.     Reviewed labs from last visit showing low iron, patient and daughter state she used to take iron supplement in the past but had stopped due to constipation, we discussed restarting every other day, increase hydration, and stool softener if needed.         Past Medical History:   Diagnosis Date    Hypertension       Past Surgical History:   Procedure Laterality Date    BREAST BIOPSY Right     HYSTERECTOMY        Family History   Problem Relation Age of Onset    Depression Mother     Sinusitis Mother     Diabetes Daughter       Social History     Socioeconomic History    Marital status:     Number of children: 6   Occupational History    Occupation: Retired   Tobacco Use    Smoking status: Some Days     Packs/day: 0.50     Types: Cigarettes      Passive exposure: Never    Smokeless tobacco: Former   Substance and Sexual Activity    Alcohol use: Not Currently    Drug use: Never    Sexual activity: Not Currently     Social Determinants of Health     Financial Resource Strain: Low Risk     Difficulty of Paying Living Expenses: Not hard at all   Food Insecurity: No Food Insecurity    Worried About Running Out of Food in the Last Year: Never true    Ran Out of Food in the Last Year: Never true   Transportation Needs: No Transportation Needs    Lack of Transportation (Medical): No    Lack of Transportation (Non-Medical): No   Physical Activity: Sufficiently Active    Days of Exercise per Week: 7 days    Minutes of Exercise per Session: 30 min   Stress: No Stress Concern Present    Feeling of Stress : Not at all   Social Connections: Moderately Isolated    Frequency of Communication with Friends and Family: More than three times a week    Frequency of Social Gatherings with Friends and Family: More than three times a week    Attends Restorationism Services: More than 4 times per year    Active Member of Clubs or Organizations: No    Attends Club or Organization Meetings: Never    Marital Status:    Housing Stability: Low Risk     Unable to Pay for Housing in the Last Year: No    Number of Places Lived in the Last Year: 1    Unstable Housing in the Last Year: No        Review of patient's allergies indicates:  No Known Allergies     Current Outpatient Medications:     albuterol (VENTOLIN HFA) 90 mcg/actuation inhaler, Inhale 1-2 puffs into the lungs every 6 (six) hours as needed for Shortness of Breath or Wheezing., Disp: 1 g, Rfl: 2    amLODIPine (NORVASC) 10 MG tablet, Take 1 tablet (10 mg total) by mouth once daily., Disp: 90 tablet, Rfl: 1    EScitalopram oxalate (LEXAPRO) 10 MG tablet, Take 1 tablet (10 mg total) by mouth once daily., Disp: 90 tablet, Rfl: 1    lisinopriL (PRINIVIL,ZESTRIL) 40 MG tablet, Take 1 tablet (40 mg total) by mouth once daily.,  "Disp: 90 tablet, Rfl: 1    traZODone (DESYREL) 50 MG tablet, Take 1 tablet (50 mg total) by mouth every evening., Disp: 90 tablet, Rfl: 1     Review of Systems:  Review of Systems   Constitutional:  Negative for chills, fever and weight loss.   HENT:  Negative for congestion, hearing loss and sore throat.    Eyes:  Negative for blurred vision.   Respiratory:  Negative for cough, sputum production and shortness of breath.    Cardiovascular:  Negative for chest pain, palpitations and leg swelling.   Gastrointestinal:  Negative for abdominal pain, constipation, diarrhea, heartburn, nausea and vomiting.   Genitourinary:  Negative for dysuria, frequency and urgency.   Musculoskeletal:  Negative for back pain, falls, joint pain and myalgias.   Neurological:  Negative for dizziness, focal weakness, weakness and headaches.   Psychiatric/Behavioral:  Positive for memory loss.       Objective:   Vitals:    05/30/23 1050   BP: 130/74   BP Location: Right arm   Patient Position: Sitting   BP Method: Large (Automatic)   Pulse: 62   Resp: 18   Temp: 98.5 °F (36.9 °C)   TempSrc: Oral   SpO2: 99%   Weight: 71.2 kg (157 lb)   Height: 5' 5" (1.651 m)        Physical Exam  Vitals reviewed.   Constitutional:       General: She is not in acute distress.     Appearance: Normal appearance.   HENT:      Head: Normocephalic and atraumatic.      Right Ear: External ear normal.      Left Ear: External ear normal.      Nose: Nose normal. No congestion or rhinorrhea.      Mouth/Throat:      Mouth: Mucous membranes are moist.      Pharynx: Oropharynx is clear. No oropharyngeal exudate or posterior oropharyngeal erythema.   Eyes:      Extraocular Movements: Extraocular movements intact.      Conjunctiva/sclera: Conjunctivae normal.      Pupils: Pupils are equal, round, and reactive to light.   Cardiovascular:      Rate and Rhythm: Normal rate and regular rhythm.      Pulses: Normal pulses.      Heart sounds: Normal heart sounds. No murmur " heard.  Pulmonary:      Effort: Pulmonary effort is normal. No respiratory distress.      Breath sounds: Normal breath sounds. No wheezing or rhonchi.   Abdominal:      General: Abdomen is flat. There is no distension.      Palpations: Abdomen is soft.      Tenderness: There is no abdominal tenderness.   Musculoskeletal:         General: Normal range of motion.      Cervical back: Normal range of motion and neck supple.      Right lower leg: No edema.      Left lower leg: No edema.   Skin:     General: Skin is warm and dry.   Neurological:      General: No focal deficit present.      Mental Status: She is alert and oriented to person, place, and time. Mental status is at baseline.   Psychiatric:         Mood and Affect: Mood normal.         Behavior: Behavior normal.         Thought Content: Thought content normal.         Judgment: Judgment normal.        Recent labs:   Latest Reference Range & Units 23 12:25   Iron 50 - 170 ug/dL 44 (L)   TIBC 250 - 450 ug/dL 294   Iron Binding Capacity Unsaturated 70 - 310 ug/dL 250   Transferrin 173 - 360 mg/dL 253   Ferritin 4.63 - 204.00 ng/mL 39.73   Iron Saturation 20 - 50 % 15 (L)   (L): Data is abnormally low        Recent Imaging:  Mammo Breast Stereotactic Biopsy 1st sit   - MAMMO BREAST STEREOTACTIC BIOPSY 1ST SITE COMPLETE    DIGITAL TOMOGRAPHIC MAMMOGRAPHY GUIDED STEREOTACTIC GUIDED BIOPSY RIGHT BREAST WITH MARKING DEVICE INSERTED AND POST DIGITAL MAMMOGRAPHIC [id number]  Stereotactic/Tomosynthesis-Guided Biopsy: Vacuum Assisted Biopsy with Post Biopsy Clip Placement:  2023       COMPARISON:  2023 and 10/26/2022       CLINICAL HISTORY:  77-year-old woman presents for stereotactic/tomosynthesis-guided core biopsy of right central to lower central breast regional calcifications, anterior to middle depth, spanning approximately 6.5 cm.      TECHNIQUE:  Procedure was explained in detail to patient.  Risks (including risks of pain, infection,  bleeding, non-recovery of targeted lesion, possible need for further intervention for diagnostic purposes), intent to place post-biopsy marking clip, benefits, and alternatives discussed. All questions were answered.  Written and verbal informed consent obtained.      Under semi-sterile conditions, the calcifications were re-identified in the lower central location of the right breast using computer-assisted tomosynthesis/stereotactic imaging in the LM projection.   Following administration of 1% Lidocaine with dilute epinephrine, a small dermatotomy was made in the skin with a scalpel and a 9 G vacuum assisted needle was introduced to the site of the calcifications. The needle was placed through the calcifications under tomosynthesis/stereotactic guidance. A total of 6 biopsy samples were obtained. Specimen radiograph demonstrated the presence of calcifications within the samples. A hydromark butterfly-shaped post-biopsy marking clip was placed with confirmation of its deployment.    Post placement digital mammographic imaging was obtained then interpreted on a dedicated mammography workstation. Subsequent right mammogram demonstrates the presence of the hydromark butterfly-shaped biopsy clip to be in the appropriate location in the lower central right breast.    The patient tolerated the procedure well and left the department in satisfactory condition with instructions that if excessive oozing or bleeding should occur, she should contact her health care provider or visit an emergency room. Results should be available within 7 working days.    IMPRESSION: STEREOTACTIC GUIDED BIOPSY HIGH RISK BENIGN   1. Stereotactic/tomosynthesis-guided core biopsy of right central to lower central breast regional calcifications, anterior to middle depth, spanning approximately 6.5 cm.  Hydromark butterfly-shaped biopsy clip is in appropriate position in the lower central right breast.    2. Pathology results pending.    Pathology  is now available for review, and demonstrates:  Stereotactic/tomosynthesis-guided core biopsy of right central to lower central breast regional calcifications, anterior to middle depth, spanning approximately 6.5 cm.  Hydromark butterfly-shaped biopsy clip is in appropriate position in the lower central right breast.  - Breast tissue with a focus of atypical apocrine adenosis, sclerosing adenosis and associated microcalcifications.  - No evidence of malignancy.     Please see pathology report for full details. These pathology results are concordant with imaging findings.     Recommend referral to the [facility] breast clinic for consultation/management.  Should excision be deferred, recommend short-interval follow-up bilateral diagnostic mammogram in 10/2023.     Per the patient's request, the pathology results were discussed with the patient's daughter, Katie Shepherd (phone number: [phone]) by Dr. ANA MARÍA Banks on 05/01/2023 at approximately 4:27 p.m.    Micah Banks M.D.    lm/:5/1/2023 16:33:20       Assessment & Plan:    Dolores Ta is presenting as above and will be treated as follows:    Dolores was seen today for follow-up and hypertension.    Diagnoses and all orders for this visit:    Primary hypertension  - continue lisinopril 40mg daily only, discontinue amlodipine for now as BP controlled on single agent    Iron deficiency anemia, unspecified iron deficiency anemia type  - restart iron supp 65mg every other day    Cognitive impairment  - patient was stopped off of both donepezil and namenda to simplify patient medication regimen and improve compliance as no change noted in cognition otherwise.  Patient staying active, discussed brain stimulating activities, and patient with increased supervision and family support. No behavioral concerns, sleeping well with trazodone.     RTC 3 months     Health Maintenance Reviewed:  Immunization History   Administered Date(s) Administered    Influenza (FLUAD) -  Quadrivalent - Adjuvanted - PF *Preferred* (65+) 10/18/2022    Pneumococcal Conjugate - 20 Valent 10/18/2022        Nikole Gupta MD  Attending - Family Medicine / Geriatric Medicine  [facility] [place]

## 2023-09-11 ENCOUNTER — OFFICE VISIT (OUTPATIENT)
Dept: FAMILY MEDICINE | Facility: CLINIC | Age: 78
End: 2023-09-11
Payer: MEDICARE

## 2023-09-11 VITALS
HEIGHT: 65 IN | SYSTOLIC BLOOD PRESSURE: 125 MMHG | WEIGHT: 153.19 LBS | OXYGEN SATURATION: 100 % | TEMPERATURE: 98 F | BODY MASS INDEX: 25.52 KG/M2 | DIASTOLIC BLOOD PRESSURE: 71 MMHG | HEART RATE: 54 BPM | RESPIRATION RATE: 18 BRPM

## 2023-09-11 DIAGNOSIS — F17.200 NICOTINE DEPENDENCE WITH CURRENT USE: ICD-10-CM

## 2023-09-11 DIAGNOSIS — I10 PRIMARY HYPERTENSION: Primary | ICD-10-CM

## 2023-09-11 DIAGNOSIS — R92.8 ABNORMAL FINDING ON BREAST IMAGING: ICD-10-CM

## 2023-09-11 DIAGNOSIS — D50.9 IRON DEFICIENCY ANEMIA, UNSPECIFIED IRON DEFICIENCY ANEMIA TYPE: ICD-10-CM

## 2023-09-11 DIAGNOSIS — Z87.891 HISTORY OF TOBACCO ABUSE: ICD-10-CM

## 2023-09-11 DIAGNOSIS — Z12.2 ENCOUNTER FOR SCREENING FOR LUNG CANCER: ICD-10-CM

## 2023-09-11 LAB
BASOPHILS # BLD AUTO: 0.02 X10(3)/MCL
BASOPHILS NFR BLD AUTO: 0.5 %
CHOLEST SERPL-MCNC: 204 MG/DL
CHOLEST/HDLC SERPL: 4 {RATIO} (ref 0–5)
EOSINOPHIL # BLD AUTO: 0.05 X10(3)/MCL (ref 0–0.9)
EOSINOPHIL NFR BLD AUTO: 1.2 %
ERYTHROCYTE [DISTWIDTH] IN BLOOD BY AUTOMATED COUNT: 16.5 % (ref 11.5–17)
FERRITIN SERPL-MCNC: 97.16 NG/ML (ref 4.63–204)
HCT VFR BLD AUTO: 36.9 % (ref 37–47)
HDLC SERPL-MCNC: 54 MG/DL (ref 35–60)
HGB BLD-MCNC: 12.1 G/DL (ref 12–16)
IMM GRANULOCYTES # BLD AUTO: 0 X10(3)/MCL (ref 0–0.04)
IMM GRANULOCYTES NFR BLD AUTO: 0 %
IRON SATN MFR SERPL: 24 % (ref 20–50)
IRON SERPL-MCNC: 67 UG/DL (ref 50–170)
LDLC SERPL CALC-MCNC: 135 MG/DL (ref 50–140)
LYMPHOCYTES # BLD AUTO: 1.43 X10(3)/MCL (ref 0.6–4.6)
LYMPHOCYTES NFR BLD AUTO: 34.9 %
MCH RBC QN AUTO: 30 PG (ref 27–31)
MCHC RBC AUTO-ENTMCNC: 32.8 G/DL (ref 33–36)
MCV RBC AUTO: 91.6 FL (ref 80–94)
MONOCYTES # BLD AUTO: 0.69 X10(3)/MCL (ref 0.1–1.3)
MONOCYTES NFR BLD AUTO: 16.8 %
NEUTROPHILS # BLD AUTO: 1.91 X10(3)/MCL (ref 2.1–9.2)
NEUTROPHILS NFR BLD AUTO: 46.6 %
NRBC BLD AUTO-RTO: 0 %
PLATELET # BLD AUTO: 314 X10(3)/MCL (ref 130–400)
PMV BLD AUTO: 9.6 FL (ref 7.4–10.4)
RBC # BLD AUTO: 4.03 X10(6)/MCL (ref 4.2–5.4)
TIBC SERPL-MCNC: 214 UG/DL (ref 70–310)
TIBC SERPL-MCNC: 281 UG/DL (ref 250–450)
TRANSFERRIN SERPL-MCNC: 256 MG/DL (ref 173–360)
TRIGL SERPL-MCNC: 77 MG/DL (ref 37–140)
VLDLC SERPL CALC-MCNC: 15 MG/DL
WBC # SPEC AUTO: 4.1 X10(3)/MCL (ref 4.5–11.5)

## 2023-09-11 PROCEDURE — 85025 COMPLETE CBC W/AUTO DIFF WBC: CPT | Performed by: FAMILY MEDICINE

## 2023-09-11 PROCEDURE — 36415 COLL VENOUS BLD VENIPUNCTURE: CPT | Performed by: FAMILY MEDICINE

## 2023-09-11 PROCEDURE — 99214 OFFICE O/P EST MOD 30 MIN: CPT | Mod: PBBFAC | Performed by: FAMILY MEDICINE

## 2023-09-11 PROCEDURE — 82728 ASSAY OF FERRITIN: CPT | Performed by: FAMILY MEDICINE

## 2023-09-11 PROCEDURE — 80061 LIPID PANEL: CPT | Performed by: FAMILY MEDICINE

## 2023-09-11 PROCEDURE — 83540 ASSAY OF IRON: CPT | Performed by: FAMILY MEDICINE

## 2023-09-11 NOTE — PROGRESS NOTES
[facility]  [facility] Note    DOS: 9/11/2023      Subjective:  Chief Complaint:    Chief Complaint   Patient presents with    Follow-up     C/o headache x1week and right shoulder pain.    Hypertension       History of Present Illness:  Dolores Ta is a 77 y.o. female with a PMH of HTN, Depression, Dementia, insomnia, GERD, chronic smoker with possible undiagnosed COPD, allergic rhinitis.     Presents to clinic today for routine follow up     Having mild frontal HA x1-2 days, intermittent, last 10-20 minutes   Reports seasonal rhinitis and recent increase in nasal congestion and post nasal drip   No vomiting, vision changes or focal weakness   Taking OTC Tylenol 500mg once daily with moderate relief     Discussed low dose lung CT with her for lung cancer screening   >20pck/year smoker and currently smoking   She is agreeable to screening, it was ordered prior but not completed     Chronic:     HTN  - Currently well controlled on lisinopril     Depression   - Mood has been stable on Lexapro   - Taking trazodone at bedtime and sleeping well     Iron deficiency anemia   - Restarted on PO iron supplementation last visit     Breast nodule   - Had bx 5/1/23 which showed atypical apocrine adenosis, sclerosing adenosis   - Seen by breast clinic in June 2023 for further eval   - Decided along with daughter not to pursue operative management, will repeat Mammo next month per radiology recommendations       Past Medical History:   Diagnosis Date    Hypertension       Past Surgical History:   Procedure Laterality Date    BREAST BIOPSY Right     HYSTERECTOMY        Family History   Problem Relation Age of Onset    Depression Mother     Sinusitis Mother     Diabetes Daughter       Social History     Socioeconomic History    Marital status:     Number of children: 6   Occupational History    Occupation: Retired   Tobacco Use    Smoking status: Some Days     Current  packs/day: 0.50     Types: Cigarettes     Passive exposure: Never    Smokeless tobacco: Former   Substance and Sexual Activity    Alcohol use: Not Currently    Drug use: Never    Sexual activity: Not Currently     Social Determinants of Health     Financial Resource Strain: Low Risk  (5/16/2023)    Overall Financial Resource Strain (CARDIA)     Difficulty of Paying Living Expenses: Not hard at all   Food Insecurity: No Food Insecurity (5/16/2023)    Hunger Vital Sign     Worried About Running Out of Food in the Last Year: Never true     Ran Out of Food in the Last Year: Never true   Transportation Needs: No Transportation Needs (5/16/2023)    PRAPARE - Transportation     Lack of Transportation (Medical): No     Lack of Transportation (Non-Medical): No   Physical Activity: Sufficiently Active (5/16/2023)    Exercise Vital Sign     Days of Exercise per Week: 7 days     Minutes of Exercise per Session: 30 min   Stress: No Stress Concern Present (5/16/2023)    [place] [employer] - Occupational Stress Questionnaire     Feeling of Stress : Not at all   Social Connections: Moderately Isolated (5/16/2023)    Social Connection and Isolation Panel [NHANES]     Frequency of Communication with Friends and Family: More than three times a week     Frequency of Social Gatherings with Friends and Family: More than three times a week     Attends [employer] Services: More than 4 times per year     Active Member of Clubs or Organizations: No     Attends Club or Organization Meetings: Never     Marital Status:    Housing Stability: Low Risk  (5/16/2023)    Housing Stability Vital Sign     Unable to Pay for Housing in the Last Year: No     Number of Places Lived in the Last Year: 1     Unstable Housing in the Last Year: No        Health Maintenance Reviewed:  Immunization History   Administered Date(s) Administered    COVID-19, MRNA, LN-S, PF (MODERNA FULL 0.5 ML DOSE) 03/03/2021, 03/30/2021    Influenza  "(FLUAD) - Quadrivalent - Adjuvanted - PF *Preferred* (65+) 10/18/2022    Pneumococcal Conjugate - 13 Valent 02/21/2018    Pneumococcal Conjugate - 20 Valent 10/18/2022    Pneumococcal Polysaccharide - 23 Valent 01/15/2014, 11/25/2016, 10/11/2021    Zoster 01/15/2014        Review of patient's allergies indicates:  No Known Allergies       Current Outpatient Medications:     albuterol (VENTOLIN HFA) 90 mcg/actuation inhaler, Inhale 1-2 puffs into the lungs every 6 (six) hours as needed for Shortness of Breath or Wheezing., Disp: 1 g, Rfl: 2    EScitalopram oxalate (LEXAPRO) 10 MG tablet, Take 1 tablet (10 mg total) by mouth once daily., Disp: 90 tablet, Rfl: 1    lisinopriL (PRINIVIL,ZESTRIL) 40 MG tablet, Take 1 tablet (40 mg total) by mouth once daily., Disp: 90 tablet, Rfl: 1    traZODone (DESYREL) 50 MG tablet, Take 1 tablet (50 mg total) by mouth every evening., Disp: 90 tablet, Rfl: 1     Review of Systems   Constitutional:  Negative for fever.   HENT:  Negative for sinus pain and sore throat.    Respiratory:  Negative for cough and shortness of breath.    Cardiovascular:  Negative for leg swelling.        Objective:   Vitals:    09/11/23 1328   BP: 125/71   BP Location: Right arm   Patient Position: Sitting   BP Method: Large (Automatic)   Pulse: (!) 54   Resp: 18   Temp: 98.4 °F (36.9 °C)   TempSrc: Oral   SpO2: 100%   Weight: 69.5 kg (153 lb 3.2 oz)   Height: 5' 5" (1.651 m)        Physical Exam  Constitutional:       General: She is not in acute distress.  HENT:      Head:      Jaw: No tenderness or pain on movement.      Nose: Nose normal. No congestion or rhinorrhea.      Right Sinus: No maxillary sinus tenderness or frontal sinus tenderness.      Left Sinus: No maxillary sinus tenderness or frontal sinus tenderness.      Mouth/Throat:      Mouth: Mucous membranes are moist.      Pharynx: Oropharynx is clear. No oropharyngeal exudate or posterior oropharyngeal erythema.   Eyes:      Conjunctiva/sclera: " Conjunctivae normal.   Cardiovascular:      Rate and Rhythm: Normal rate and regular rhythm.      Heart sounds: No murmur heard.     No friction rub. No gallop.   Pulmonary:      Effort: Pulmonary effort is normal. No respiratory distress.      Breath sounds: Normal breath sounds. No wheezing or rales.   Abdominal:      General: Abdomen is flat. Bowel sounds are normal. There is no distension.      Palpations: Abdomen is soft.      Tenderness: There is no abdominal tenderness.   Musculoskeletal:      Right lower leg: No edema.      Left lower leg: No edema.   Skin:     General: Skin is warm.   Neurological:      General: No focal deficit present.      Mental Status: She is alert and oriented to person, place, and time.          Depression Assessment:       9/11/2023     1:27 PM 6/8/2023     1:52 PM 5/30/2023    10:49 AM 5/16/2023    10:44 AM 10/18/2022     1:03 PM 9/20/2022     2:06 PM   Depression Patient Health Questionnaire   Over the last two weeks how often have you been bothered by little interest or pleasure in doing things Not at all Not at all Not at all Not at all Not at all Not at all   Over the last two weeks how often have you been bothered by feeling down, depressed or hopeless Not at all Not at all Not at all Not at all Not at all More than half the days   PHQ-2 Total Score 0 0 0 0 0 2       Recent labs:  CBC:  Lab Results   Component Value Date    WBC 4.1 (L) 04/03/2023    RBC 3.83 (L) 04/03/2023    HGB 11.1 (L) 04/03/2023    HCT 35.1 (L) 04/03/2023    MCV 91.6 04/03/2023    MCH 29.0 04/03/2023    MCHC 31.6 (L) 04/03/2023    RDW 16.1 04/03/2023     04/03/2023    MPV 9.4 04/03/2023      CMP:  Sodium Level   Date Value Ref Range Status   04/03/2023 139 136 - 145 mmol/L Final     Potassium Level   Date Value Ref Range Status   04/03/2023 4.4 3.5 - 5.1 mmol/L Final     Carbon Dioxide   Date Value Ref Range Status   04/03/2023 24 23 - 31 mmol/L Final     Blood Urea Nitrogen   Date Value Ref Range  "Status   2023 9.4 (L) 9.8 - 20.1 mg/dL Final     Creatinine   Date Value Ref Range Status   2023 0.87 0.55 - 1.02 mg/dL Final     Calcium Level Total   Date Value Ref Range Status   2023 9.7 8.4 - 10.2 mg/dL Final     Albumin Level   Date Value Ref Range Status   2023 3.5 3.4 - 4.8 g/dL Final     Bilirubin Total   Date Value Ref Range Status   2023 0.3 <=1.5 mg/dL Final     Alkaline Phosphatase   Date Value Ref Range Status   2023 90 40 - 150 unit/L Final     Aspartate Aminotransferase   Date Value Ref Range Status   2023 22 5 - 34 unit/L Final     Alanine Aminotransferase   Date Value Ref Range Status   2023 9 0 - 55 unit/L Final     Estimated GFR-Non    Date Value Ref Range Status   2022 >60        BMP:  Lab Results   Component Value Date     2023    K 4.4 2023    CO2 24 2023    BUN 9.4 (L) 2023    CREATININE 0.87 2023    CALCIUM 9.7 2023    EGFRNONAA >60 2022      Lipid Panel:  Lab Results   Component Value Date    CHOL 169 10/30/2017     Lab Results   Component Value Date    HDL 52 10/30/2017     No results found for: "LDLCALC"  Lab Results   Component Value Date    TRIG 75 10/30/2017     No results found for: "CHOLHDL"   HbA1c:  Lab Results   Component Value Date    HGBA1C 6.2 10/30/2017      TSH:  Lab Results   Component Value Date    TSH 0.851 2023       Recent Imaging:  Mammo Breast Stereotactic Biopsy 1st sit   - MAMMO BREAST STEREOTACTIC BIOPSY 1ST SITE COMPLETE    DIGITAL TOMOGRAPHIC MAMMOGRAPHY GUIDED STEREOTACTIC GUIDED BIOPSY RIGHT BREAST WITH MARKING DEVICE INSERTED AND POST DIGITAL MAMMOGRAPHIC IMAGIN2023  Stereotactic/Tomosynthesis-Guided Biopsy: Vacuum Assisted Biopsy with Post Biopsy Clip Placement:  2023       COMPARISON:  2023 and 10/26/2022       CLINICAL HISTORY:  77-year-old woman presents for stereotactic/tomosynthesis-guided core biopsy of right " central to lower central breast regional calcifications, anterior to middle depth, spanning approximately 6.5 cm.      TECHNIQUE:  Procedure was explained in detail to patient.  Risks (including risks of pain, infection, bleeding, non-recovery of targeted lesion, possible need for further intervention for diagnostic purposes), intent to place post-biopsy marking clip, benefits, and alternatives discussed. All questions were answered.  Written and verbal informed consent obtained.      Under semi-sterile conditions, the calcifications were re-identified in the lower central location of the right breast using computer-assisted tomosynthesis/stereotactic imaging in the LM projection.   Following administration of 1% Lidocaine with dilute epinephrine, a small dermatotomy was made in the skin with a scalpel and a 9 G vacuum assisted needle was introduced to the site of the calcifications. The needle was placed through the calcifications under tomosynthesis/stereotactic guidance. A total of 6 biopsy samples were obtained. Specimen radiograph demonstrated the presence of calcifications within the samples. A hydromark butterfly-shaped post-biopsy marking clip was placed with confirmation of its deployment.    Post placement digital mammographic imaging was obtained then interpreted on a dedicated mammography workstation. Subsequent right mammogram demonstrates the presence of the hydromark butterfly-shaped biopsy clip to be in the appropriate location in the lower central right breast.    The patient tolerated the procedure well and left the department in satisfactory condition with instructions that if excessive oozing or bleeding should occur, she should contact her health care provider or visit an emergency room. Results should be available within 7 working days.    IMPRESSION: STEREOTACTIC GUIDED BIOPSY HIGH RISK BENIGN   1. Stereotactic/tomosynthesis-guided core biopsy of right central to lower central breast regional  calcifications, anterior to middle depth, spanning approximately 6.5 cm.  Hydromark butterfly-shaped biopsy clip is in appropriate position in the lower central right breast.    2. Pathology results pending.    Pathology is now available for review, and demonstrates:  Stereotactic/tomosynthesis-guided core biopsy of right central to lower central breast regional calcifications, anterior to middle depth, spanning approximately 6.5 cm.  Hydromark butterfly-shaped biopsy clip is in appropriate position in the lower central right breast.  - Breast tissue with a focus of atypical apocrine adenosis, sclerosing adenosis and associated microcalcifications.  - No evidence of malignancy.     Please see pathology report for full details. These pathology results are concordant with imaging findings.     Recommend referral to the [facility] breast clinic for consultation/management.  Should excision be deferred, recommend short-interval follow-up bilateral diagnostic mammogram in 10/2023.     Per the patient's request, the pathology results were discussed with the patient's daughter, Katie Shepherd (phone number: [phone]) by Dr. ANA MARÍA Banks on 05/01/2023 at approximately 4:27 p.m.    Micah Banks M.D.    lm/:5/1/2023 16:33:20       Assessment & Plan:    Dolores Ta is presenting as above and will be treated as follows:    1. Primary hypertension  - Well controlled, continue current regimen     2. Iron deficiency anemia, unspecified iron deficiency anemia type  - Repeat CBC and iron panel   - Continue iron supplementation     3. Abnormal finding on breast imaging  - Elected for non operative management   - Repeat Mammo for next month ordered     4. Encounter for screening for lung cancer  5. History of tobacco abuse  6. Nicotine dependence with current use  - Discussed low dose CT with her and daughter and they are agreeable   - Order placed     RTC 3 months or sooner if needed

## 2023-11-15 DIAGNOSIS — F41.9 ANXIETY: ICD-10-CM

## 2023-11-15 DIAGNOSIS — R41.89 COGNITIVE IMPAIRMENT: ICD-10-CM

## 2023-11-16 RX ORDER — ESCITALOPRAM OXALATE 10 MG/1
10 TABLET ORAL DAILY
Qty: 30 TABLET | Refills: 0 | Status: SHIPPED | OUTPATIENT
Start: 2023-11-16 | End: 2023-12-21 | Stop reason: SDUPTHER

## 2023-11-27 NOTE — PROGRESS NOTES
Date of Service: 9/11/2023    Attending Attestation: Patient discussed with geriatric fellow Dr. Kim . The chart was reviewed thoroughly including pertinent vitals, labs, imaging, medications, prior notes, and consultant/specialist recommendations.  I participated in the management of the patient, examined the patient, reviewed the summary of the plan, and was immediately available at all times throughout the encounter. Services were furnished in a primary care center located in the outpatient department of a teaching hospital. I agree with the fellow's findings and plan as documented in their note.    Nikole Gupta MD  Attending - Family Medicine / Geriatric Medicine  [facility] [place], [facility]

## 2023-11-28 ENCOUNTER — HOSPITAL ENCOUNTER (OUTPATIENT)
Dept: RADIOLOGY | Facility: HOSPITAL | Age: 78
Discharge: HOME | End: 2023-11-28
Attending: STUDENT IN AN ORGANIZED HEALTH CARE EDUCATION/TRAINING PROGRAM
Payer: MEDICARE

## 2023-11-28 DIAGNOSIS — R92.8 ABNORMAL FINDING ON BREAST IMAGING: ICD-10-CM

## 2023-12-05 ENCOUNTER — HOSPITAL ENCOUNTER (OUTPATIENT)
Dept: RADIOLOGY | Facility: HOSPITAL | Age: 78
Discharge: HOME | End: 2023-12-05
Attending: STUDENT IN AN ORGANIZED HEALTH CARE EDUCATION/TRAINING PROGRAM
Payer: MEDICARE

## 2023-12-05 PROCEDURE — 77066 DX MAMMO INCL CAD BI: CPT | Mod: 26,,, | Performed by: RADIOLOGY

## 2023-12-05 PROCEDURE — 77066 DX MAMMO INCL CAD BI: CPT | Mod: TC

## 2023-12-05 PROCEDURE — 77062 BREAST TOMOSYNTHESIS BI: CPT | Mod: 26,,, | Performed by: RADIOLOGY

## 2023-12-05 PROCEDURE — 77066 MAMMO DIGITAL DIAGNOSTIC BILAT WITH TOMO: ICD-10-PCS | Mod: 26,,, | Performed by: RADIOLOGY

## 2023-12-05 PROCEDURE — 77062 MAMMO DIGITAL DIAGNOSTIC BILAT WITH TOMO: ICD-10-PCS | Mod: 26,,, | Performed by: RADIOLOGY

## 2023-12-08 DIAGNOSIS — R41.89 COGNITIVE IMPAIRMENT: ICD-10-CM

## 2023-12-08 DIAGNOSIS — F41.9 ANXIETY: ICD-10-CM

## 2023-12-21 DIAGNOSIS — R41.89 COGNITIVE IMPAIRMENT: ICD-10-CM

## 2023-12-21 DIAGNOSIS — F41.9 ANXIETY: ICD-10-CM

## 2023-12-21 RX ORDER — ESCITALOPRAM OXALATE 10 MG/1
10 TABLET ORAL
Qty: 30 TABLET | Refills: 1 | OUTPATIENT
Start: 2023-12-21

## 2023-12-21 RX ORDER — ESCITALOPRAM OXALATE 10 MG/1
10 TABLET ORAL DAILY
Qty: 90 TABLET | Refills: 1 | Status: SHIPPED | OUTPATIENT
Start: 2023-12-21 | End: 2024-01-29 | Stop reason: SDUPTHER

## 2024-01-29 ENCOUNTER — OFFICE VISIT (OUTPATIENT)
Dept: FAMILY MEDICINE | Facility: CLINIC | Age: 79
End: 2024-01-29
Payer: MEDICARE

## 2024-01-29 VITALS
DIASTOLIC BLOOD PRESSURE: 79 MMHG | SYSTOLIC BLOOD PRESSURE: 170 MMHG | TEMPERATURE: 98 F | RESPIRATION RATE: 20 BRPM | HEART RATE: 60 BPM | HEIGHT: 65 IN | WEIGHT: 159 LBS | BODY MASS INDEX: 26.49 KG/M2 | OXYGEN SATURATION: 100 %

## 2024-01-29 DIAGNOSIS — F32.A DEPRESSION, UNSPECIFIED DEPRESSION TYPE: ICD-10-CM

## 2024-01-29 DIAGNOSIS — M85.80 OSTEOPENIA, UNSPECIFIED LOCATION: ICD-10-CM

## 2024-01-29 DIAGNOSIS — R92.8 ABNORMAL FINDING ON BREAST IMAGING: ICD-10-CM

## 2024-01-29 DIAGNOSIS — G47.00 INSOMNIA, UNSPECIFIED TYPE: ICD-10-CM

## 2024-01-29 DIAGNOSIS — R21 RASH: ICD-10-CM

## 2024-01-29 DIAGNOSIS — I10 PRIMARY HYPERTENSION: Primary | ICD-10-CM

## 2024-01-29 PROBLEM — E66.9 OBESITY: Status: ACTIVE | Noted: 2024-01-29

## 2024-01-29 PROBLEM — F05 ACUTE CONFUSIONAL STATE: Status: RESOLVED | Noted: 2022-06-16 | Resolved: 2024-01-29

## 2024-01-29 PROCEDURE — 99214 OFFICE O/P EST MOD 30 MIN: CPT | Mod: PBBFAC | Performed by: FAMILY MEDICINE

## 2024-01-29 RX ORDER — LISINOPRIL 40 MG/1
40 TABLET ORAL DAILY
Qty: 90 TABLET | Refills: 1 | Status: SHIPPED | OUTPATIENT
Start: 2024-01-29 | End: 2024-06-12 | Stop reason: SDUPTHER

## 2024-01-29 RX ORDER — TRIAMCINOLONE ACETONIDE 1 MG/G
CREAM TOPICAL 2 TIMES DAILY
Qty: 28.4 G | Refills: 0 | Status: SHIPPED | OUTPATIENT
Start: 2024-01-29 | End: 2024-06-12 | Stop reason: SDUPTHER

## 2024-01-29 RX ORDER — TRAZODONE HYDROCHLORIDE 50 MG/1
50 TABLET ORAL NIGHTLY
Qty: 90 TABLET | Refills: 1 | Status: SHIPPED | OUTPATIENT
Start: 2024-01-29 | End: 2024-06-12 | Stop reason: SDUPTHER

## 2024-01-29 RX ORDER — VIT C/E/ZN/COPPR/LUTEIN/ZEAXAN 250MG-90MG
1000 CAPSULE ORAL DAILY
Qty: 90 CAPSULE | Refills: 0 | Status: SHIPPED | OUTPATIENT
Start: 2024-01-29 | End: 2024-04-05 | Stop reason: SDUPTHER

## 2024-01-29 RX ORDER — ESCITALOPRAM OXALATE 10 MG/1
10 TABLET ORAL DAILY
Qty: 90 TABLET | Refills: 1 | Status: SHIPPED | OUTPATIENT
Start: 2024-01-29 | End: 2024-06-12 | Stop reason: SDUPTHER

## 2024-01-29 NOTE — PATIENT INSTRUCTIONS
DOVE sensitive skin soap, stop using DIAL   No rubbing alcohol  Use steroid cream twice daily after bathing first, then apply moisturizer

## 2024-01-29 NOTE — PROGRESS NOTES
[facility]  [facility] [facility] [place]    DOS: 1/29/2024      Subjective:  Chief Complaint:    Chief Complaint   Patient presents with    Hypertension     Rash to neck, back, breast, arms       History of Present Illness:  Dolores Ta is a 78 y.o. female with a PMH of HTN, Depression, Dementia, insomnia, GERD, chronic smoker, allergic rhinitis, osteopenia.    Here for Geriatrics routine follow up:     Acute:   Rash onset 2 weeks, gradual onset and persistent   Associated w/ pruritus   Located on neck, central chest, inferior breast. Dry, scaly skin. No redness, swelling or discharge   Using tide laundry detergent, dial soap  Bathing in ETOH without relief, not applying anything topically   No new perfumes, no new foods  Takes hot baths     Chronic:      HTN  - Elevated today, asymptomatic   - Reports she has been out of her medications for at least a few days however last time her lisinopril was filled was 5/11/23 for 6 months so questionable adherence  - Daughter reports unsure if she has been taking lisinopril, daughter reports one medication in AM and one in PM, likely her lexapro and trazodone    - On lisinopril 40mg, Norvasc was discontinued previously since her pressure was well controlled on single agent      Depression   - Mood has been stable on Lexapro   - Taking trazodone at bedtime and sleeping well      Iron deficiency anemia   - Restarted on PO iron supplementation  - Stable      Breast nodule   - Had bx 5/1/23 which showed atypical apocrine adenosis, sclerosing adenosis   - Seen by breast clinic in June 2023 for further eval   - Decided along with daughter not to pursue operative management  - Repeat Mammo 9/2023 reviewed w/ plans for repeat diagnostic mammo in 6 months     Health Maintenance:  Colon cancer screening: None on file   Lung Cancer screen: no lung screening on file, patient is a 27 pack year smoker. LDCT ordered, she missed her appt and did  not re-schedule   Mammogram: As above   Dexa: 2022, T score -1.6 osteopenia. FRAX major 13%, hip 4%   Vaccines      Pneumonia: PPSV 23 done 11/2016, PCV 13 done 2/21/2018, pcv 20 10/2022       Zoster: 1 dose done 1/2014      Tdap: No record of Tdap on file    Past Medical History:   Diagnosis Date    Hypertension       Past Surgical History:   Procedure Laterality Date    BREAST BIOPSY Right     HYSTERECTOMY        Family History   Problem Relation Age of Onset    Depression Mother     Sinusitis Mother     Diabetes Daughter       Social History     Socioeconomic History    Marital status:     Number of children: 6   Occupational History    Occupation: Retired   Tobacco Use    Smoking status: Some Days     Current packs/day: 0.50     Average packs/day: 0.5 packs/day for 55.4 years (27.7 ttl pk-yrs)     Types: Cigarettes     Start date: 9/11/1968     Passive exposure: Never    Smokeless tobacco: Former   Substance and Sexual Activity    Alcohol use: Not Currently    Drug use: Never    Sexual activity: Not Currently     Social Determinants of Health     Financial Resource Strain: Low Risk  (5/16/2023)    Overall Financial Resource Strain (CARDIA)     Difficulty of Paying Living Expenses: Not hard at all   Food Insecurity: No Food Insecurity (5/16/2023)    Hunger Vital Sign     Worried About Running Out of Food in the Last Year: Never true     Ran Out of Food in the Last Year: Never true   Transportation Needs: No Transportation Needs (5/16/2023)    PRAPARE - Transportation     Lack of Transportation (Medical): No     Lack of Transportation (Non-Medical): No   Physical Activity: Sufficiently Active (5/16/2023)    Exercise Vital Sign     Days of Exercise per Week: 7 days     Minutes of Exercise per Session: 30 min   Stress: No Stress Concern Present (5/16/2023)    [place] [employer] - Occupational Stress Questionnaire     Feeling of Stress : Not at all   Social Connections: Moderately  Isolated (5/16/2023)    Social Connection and Isolation Panel [NHANES]     Frequency of Communication with Friends and Family: More than three times a week     Frequency of Social Gatherings with Friends and Family: More than three times a week     Attends Rastafarian Services: More than 4 times per year     Active Member of Clubs or Organizations: No     Attends Club or Organization Meetings: Never     Marital Status:    Housing Stability: Low Risk  (5/16/2023)    Housing Stability Vital Sign     Unable to Pay for Housing in the Last Year: No     Number of Places Lived in the Last Year: 1     Unstable Housing in the Last Year: No      Health Maintenance Reviewed:  Immunization History   Administered Date(s) Administered    COVID-19, MRNA, LN-S, PF (MODERNA FULL 0.5 ML DOSE) 03/03/2021, 03/30/2021    Influenza (FLUAD) - Quadrivalent - Adjuvanted - PF *Preferred* (65+) 10/18/2022    Pneumococcal Conjugate - 13 Valent 02/21/2018    Pneumococcal Conjugate - 20 Valent 10/18/2022    Pneumococcal Polysaccharide - 23 Valent 01/15/2014, 11/25/2016, 10/11/2021    Zoster 01/15/2014     Review of patient's allergies indicates:  No Known Allergies     Current Outpatient Medications:     albuterol (VENTOLIN HFA) 90 mcg/actuation inhaler, Inhale 1-2 puffs into the lungs every 6 (six) hours as needed for Shortness of Breath or Wheezing., Disp: 1 g, Rfl: 2    EScitalopram oxalate (LEXAPRO) 10 MG tablet, Take 1 tablet (10 mg total) by mouth once daily., Disp: 90 tablet, Rfl: 1    lisinopriL (PRINIVIL,ZESTRIL) 40 MG tablet, Take 1 tablet (40 mg total) by mouth once daily., Disp: 90 tablet, Rfl: 1    traZODone (DESYREL) 50 MG tablet, Take 1 tablet (50 mg total) by mouth every evening., Disp: 90 tablet, Rfl: 1     Review of Systems   Constitutional:  Negative for fever.   Respiratory:  Negative for sputum production and shortness of breath.    Cardiovascular:  Negative for chest pain, palpitations and leg swelling.  "  Gastrointestinal:  Negative for abdominal pain, constipation and diarrhea.   Genitourinary:  Negative for dysuria.      Objective:   Vitals:    01/29/24 1352 01/29/24 1357   BP: (!) 165/89 (!) 170/79   BP Location: Left arm Left arm   Patient Position: Sitting Sitting   BP Method: Medium (Automatic) Medium (Automatic)   Pulse: 60    Resp: 20    Temp: 98.2 °F (36.8 °C)    TempSrc: Oral    SpO2: 100%    Weight: 72.1 kg (159 lb)    Height: 5' 5" (1.651 m)        Physical Exam  Constitutional:       General: She is not in acute distress.     Appearance: She is not toxic-appearing.      Comments: Pleasant, elderly female    HENT:      Mouth/Throat:      Mouth: Mucous membranes are moist.   Eyes:      Conjunctiva/sclera: Conjunctivae normal.   Cardiovascular:      Rate and Rhythm: Normal rate and regular rhythm.      Heart sounds: No murmur heard.     No friction rub. No gallop.   Pulmonary:      Effort: Pulmonary effort is normal. No respiratory distress.      Breath sounds: No stridor. No wheezing, rhonchi or rales.      Comments: Diminished breath sounds bilaterally   Abdominal:      General: Abdomen is flat. Bowel sounds are normal. There is no distension.      Palpations: Abdomen is soft.      Tenderness: There is no abdominal tenderness. There is no guarding.   Musculoskeletal:      Right lower leg: No edema.      Left lower leg: No edema.   Skin:     General: Skin is warm.      Comments: Small xerotic, scaly lesions to bilateral neck, central chest, bilateral inferior breast w/ excoriations. No erythema, swelling, satellite lesions    Neurological:      General: No focal deficit present.      Mental Status: She is alert and oriented to person, place, and time.        Depression Assessment:       1/29/2024     1:50 PM 9/11/2023     1:27 PM 6/8/2023     1:52 PM 5/30/2023    10:49 AM 5/16/2023    10:44 AM 10/18/2022     1:03 PM 9/20/2022     2:06 PM   Depression Patient Health Questionnaire   Over the last two weeks " how often have you been bothered by little interest or pleasure in doing things Not at all Not at all Not at all Not at all Not at all Not at all Not at all   Over the last two weeks how often have you been bothered by feeling down, depressed or hopeless Not at all Not at all Not at all Not at all Not at all Not at all More than half the days   PHQ-2 Total Score 0 0 0 0 0 0 2     Recent labs:  CBC:  Lab Results   Component Value Date    WBC 4.10 (L) 09/11/2023    RBC 4.03 (L) 09/11/2023    HGB 12.1 09/11/2023    HCT 36.9 (L) 09/11/2023    MCV 91.6 09/11/2023    MCH 30.0 09/11/2023    MCHC 32.8 (L) 09/11/2023    RDW 16.5 09/11/2023     09/11/2023    MPV 9.6 09/11/2023      CMP:  Sodium Level   Date Value Ref Range Status   04/03/2023 139 136 - 145 mmol/L Final     Potassium Level   Date Value Ref Range Status   04/03/2023 4.4 3.5 - 5.1 mmol/L Final     Carbon Dioxide   Date Value Ref Range Status   04/03/2023 24 23 - 31 mmol/L Final     Blood Urea Nitrogen   Date Value Ref Range Status   04/03/2023 9.4 (L) 9.8 - 20.1 mg/dL Final     Creatinine   Date Value Ref Range Status   04/03/2023 0.87 0.55 - 1.02 mg/dL Final     Calcium Level Total   Date Value Ref Range Status   04/03/2023 9.7 8.4 - 10.2 mg/dL Final     Albumin Level   Date Value Ref Range Status   04/03/2023 3.5 3.4 - 4.8 g/dL Final     Bilirubin Total   Date Value Ref Range Status   04/03/2023 0.3 <=1.5 mg/dL Final     Alkaline Phosphatase   Date Value Ref Range Status   04/03/2023 90 40 - 150 unit/L Final     Aspartate Aminotransferase   Date Value Ref Range Status   04/03/2023 22 5 - 34 unit/L Final     Alanine Aminotransferase   Date Value Ref Range Status   04/03/2023 9 0 - 55 unit/L Final     Estimated GFR-Non    Date Value Ref Range Status   03/08/2022 >60        BMP:  Lab Results   Component Value Date     04/03/2023    K 4.4 04/03/2023    CO2 24 04/03/2023    BUN 9.4 (L) 04/03/2023    CREATININE 0.87 04/03/2023    CALCIUM  "9.7 04/03/2023    EGFRNONAA >60 03/08/2022      Lipid Panel:  Lab Results   Component Value Date    CHOL 204 (H) 09/11/2023    CHOL 169 10/30/2017     Lab Results   Component Value Date    HDL 54 09/11/2023    HDL 52 10/30/2017     No results found for: "LDLCALC"  Lab Results   Component Value Date    TRIG 77 09/11/2023    TRIG 75 10/30/2017     No results found for: "CHOLHDL"   HbA1c:  Lab Results   Component Value Date    HGBA1C 6.2 10/30/2017      TSH:  Lab Results   Component Value Date    TSH 0.851 04/03/2023       Recent Imaging:  Mammo Digital Diagnostic Bilat with Tammy   - MAMMO DIGITAL DIAGNOSTIC BILAT WITH TAMMY    BILATERAL DIGITAL DIAGNOSTIC MAMMOGRAM 3D/2D WITH CAD: 12/5/2023  Exam(s):  Bilateral diagnostic mammography including digital 2-D and tomosynthesis CC, MLO, and ML views with spot magnification    Clinical History:  78-year-old woman with stereotactic-guided core biopsy of right central to lower central breast calcifications, anterior to middle depth, spanning approximately 6.5 cm on 04/26/2023 demarcated by a hydromark butterfly-shaped biopsy clip with pathology revealing Breast tissue with a focus of atypical apocrine adenosis, sclerosing adenosis and associated microcalcifications.    Comparison:  Prior examinations dated 04/26/2023, 04/12/2023, 10/26/2022, 07/13/2020, 03/27/2015, 03/25/2014, and 11/28/2012 were reviewed for comparison.    Mammographic Findings:  There are scattered areas of fibroglandular density.       There are regional predominantly round and punctate calcifications in the central to lower central right breast, anterior to middle depth, spanning approximately 6.5 cm which are not significantly changed.  The hydromark butterfly-shaped biopsy clip is in the lower central right breast, middle depth, demarcating the site of biopsy with focus of atypia.    CAD was used in the interpretation of this mammography study.    IMPRESSION: BENIGN  Right Breast: Benign (BI-RADS " 2)    Recommendations:  Right central to lower central breast regional calcifications, anterior to middle depth, spanning approximately 6.5 cm which are not significantly changed with stereotactic-guided core biopsy on 04/26/2023 demarcated by a hydromark butterfly-shaped biopsy clip revealing a focus of atypia.  Should surgical excision be deferred, consider follow-up diagnostic mammogram in 6-12 months.      Findings and recommendations were discussed by Dr. ANA MARÍA Banks with the patient and her daughter.    Micah Banks M.D.            lm/:12/5/2023 15:07:31      letter sent: Mammography Normal    Mammogram BI-RADS: 2 Benign       Assessment & Plan:    Dolores Ta is presenting as above and will be treated as follows:    1. Primary hypertension  - She has been out of her medication   - Restart her lisinopril 40mg   - Continue to monitor   - BP check in 2 weeks     2. Insomnia, unspecified type  - Continue trazodone     3. Depression, unspecified depression type  - Continue Lexapro     4. Osteopenia, unspecified location  - Reviewed DEXA with her   - Start Vit D supplementation   - Check Vit D level next visit     5. Abnormal finding on breast imaging  - Continue screening w/ diagnostic mammo     6. Rash  - Suspect eczematous lesions   - Routine skin precautions  - Avoid hot baths, scented soap/lotion, avoid jewelery   - Moisturizer daily  - Triamcinolone 0.1% BID until resolution      RTC 2 weeks BP and skin check   Needs Vit D, Shingrix #2

## 2024-03-18 ENCOUNTER — HOSPITAL ENCOUNTER (EMERGENCY)
Facility: HOSPITAL | Age: 79
Discharge: HOME | End: 2024-03-18
Attending: STUDENT IN AN ORGANIZED HEALTH CARE EDUCATION/TRAINING PROGRAM
Payer: COMMERCIAL

## 2024-03-18 VITALS
OXYGEN SATURATION: 100 % | TEMPERATURE: 99 F | HEART RATE: 62 BPM | SYSTOLIC BLOOD PRESSURE: 160 MMHG | RESPIRATION RATE: 100 BRPM | DIASTOLIC BLOOD PRESSURE: 86 MMHG | HEIGHT: 64 IN | WEIGHT: 212 LBS | BODY MASS INDEX: 36.19 KG/M2

## 2024-03-18 DIAGNOSIS — R06.2 WHEEZING: ICD-10-CM

## 2024-03-18 DIAGNOSIS — R06.02 SHORTNESS OF BREATH: Primary | ICD-10-CM

## 2024-03-18 DIAGNOSIS — J20.9 ACUTE BRONCHITIS, UNSPECIFIED ORGANISM: ICD-10-CM

## 2024-03-18 DIAGNOSIS — M79.672 LEFT FOOT PAIN: ICD-10-CM

## 2024-03-18 LAB
ALBUMIN SERPL-MCNC: 3.4 G/DL (ref 3.4–4.8)
ALBUMIN/GLOB SERPL: 0.9 RATIO (ref 1.1–2)
ALP SERPL-CCNC: 99 UNIT/L (ref 40–150)
ALT SERPL-CCNC: 8 UNIT/L (ref 0–55)
AST SERPL-CCNC: 21 UNIT/L (ref 5–34)
BASOPHILS # BLD AUTO: 0.01 X10(3)/MCL
BASOPHILS NFR BLD AUTO: 0.3 %
BILIRUB SERPL-MCNC: 0.2 MG/DL
BNP BLD-MCNC: 361 PG/ML
BUN SERPL-MCNC: 9.7 MG/DL (ref 9.8–20.1)
CALCIUM SERPL-MCNC: 9.1 MG/DL (ref 8.4–10.2)
CHLORIDE SERPL-SCNC: 108 MMOL/L (ref 98–107)
CO2 SERPL-SCNC: 22 MMOL/L (ref 23–31)
CREAT SERPL-MCNC: 1.11 MG/DL (ref 0.55–1.02)
EOSINOPHIL # BLD AUTO: 0.06 X10(3)/MCL (ref 0–0.9)
EOSINOPHIL NFR BLD AUTO: 1.5 %
ERYTHROCYTE [DISTWIDTH] IN BLOOD BY AUTOMATED COUNT: 16 % (ref 11.5–17)
FLUAV AG UPPER RESP QL IA.RAPID: NOT DETECTED
FLUBV AG UPPER RESP QL IA.RAPID: NOT DETECTED
GFR SERPLBLD CREATININE-BSD FMLA CKD-EPI: 52 MLS/MIN/1.73/M2
GLOBULIN SER-MCNC: 3.9 GM/DL (ref 2.4–3.5)
GLUCOSE SERPL-MCNC: 78 MG/DL (ref 82–115)
HCT VFR BLD AUTO: 37.9 % (ref 37–47)
HGB BLD-MCNC: 12.3 G/DL (ref 12–16)
IMM GRANULOCYTES # BLD AUTO: 0.01 X10(3)/MCL (ref 0–0.04)
IMM GRANULOCYTES NFR BLD AUTO: 0.3 %
LYMPHOCYTES # BLD AUTO: 1.32 X10(3)/MCL (ref 0.6–4.6)
LYMPHOCYTES NFR BLD AUTO: 33.6 %
MCH RBC QN AUTO: 28.8 PG (ref 27–31)
MCHC RBC AUTO-ENTMCNC: 32.5 G/DL (ref 33–36)
MCV RBC AUTO: 88.8 FL (ref 80–94)
MONOCYTES # BLD AUTO: 0.76 X10(3)/MCL (ref 0.1–1.3)
MONOCYTES NFR BLD AUTO: 19.3 %
NEUTROPHILS # BLD AUTO: 1.77 X10(3)/MCL (ref 2.1–9.2)
NEUTROPHILS NFR BLD AUTO: 45 %
NRBC BLD AUTO-RTO: 0 %
PLATELET # BLD AUTO: 212 X10(3)/MCL (ref 130–400)
PMV BLD AUTO: 9.4 FL (ref 7.4–10.4)
POTASSIUM SERPL-SCNC: 3.4 MMOL/L (ref 3.5–5.1)
PROT SERPL-MCNC: 7.3 GM/DL (ref 5.8–7.6)
RBC # BLD AUTO: 4.27 X10(6)/MCL (ref 4.2–5.4)
SARS-COV-2 RNA RESP QL NAA+PROBE: NOT DETECTED
SODIUM SERPL-SCNC: 139 MMOL/L (ref 136–145)
TROPONIN I SERPL-MCNC: 0.02 NG/ML (ref 0–0.04)
WBC # SPEC AUTO: 3.93 X10(3)/MCL (ref 4.5–11.5)

## 2024-03-18 PROCEDURE — 83880 ASSAY OF NATRIURETIC PEPTIDE: CPT | Performed by: PHYSICIAN ASSISTANT

## 2024-03-18 PROCEDURE — 85025 COMPLETE CBC W/AUTO DIFF WBC: CPT | Performed by: PHYSICIAN ASSISTANT

## 2024-03-18 PROCEDURE — 25000242 PHARM REV CODE 250 ALT 637 W/ HCPCS: Performed by: STUDENT IN AN ORGANIZED HEALTH CARE EDUCATION/TRAINING PROGRAM

## 2024-03-18 PROCEDURE — 80053 COMPREHEN METABOLIC PANEL: CPT | Performed by: PHYSICIAN ASSISTANT

## 2024-03-18 PROCEDURE — 84484 ASSAY OF TROPONIN QUANT: CPT | Performed by: PHYSICIAN ASSISTANT

## 2024-03-18 PROCEDURE — 0240U COVID/FLU A&B PCR: CPT | Performed by: PHYSICIAN ASSISTANT

## 2024-03-18 PROCEDURE — 99285 EMERGENCY DEPT VISIT HI MDM: CPT | Mod: 25

## 2024-03-18 PROCEDURE — 25000242 PHARM REV CODE 250 ALT 637 W/ HCPCS: Performed by: PHYSICIAN ASSISTANT

## 2024-03-18 PROCEDURE — 63600175 PHARM REV CODE 636 W HCPCS

## 2024-03-18 PROCEDURE — 96374 THER/PROPH/DIAG INJ IV PUSH: CPT

## 2024-03-18 PROCEDURE — 93005 ELECTROCARDIOGRAM TRACING: CPT

## 2024-03-18 RX ORDER — METHYLPREDNISOLONE SOD SUCC 125 MG
125 VIAL (EA) INJECTION
Status: COMPLETED | OUTPATIENT
Start: 2024-03-18 | End: 2024-03-18

## 2024-03-18 RX ORDER — IPRATROPIUM BROMIDE AND ALBUTEROL SULFATE 2.5; .5 MG/3ML; MG/3ML
3 SOLUTION RESPIRATORY (INHALATION) EVERY 6 HOURS PRN
Qty: 75 ML | Refills: 0 | Status: SHIPPED | OUTPATIENT
Start: 2024-03-18 | End: 2025-03-18

## 2024-03-18 RX ORDER — DOXYCYCLINE HYCLATE 100 MG
100 TABLET ORAL 2 TIMES DAILY
Qty: 14 TABLET | Refills: 0 | Status: SHIPPED | OUTPATIENT
Start: 2024-03-18 | End: 2024-03-25

## 2024-03-18 RX ORDER — IPRATROPIUM BROMIDE AND ALBUTEROL SULFATE 2.5; .5 MG/3ML; MG/3ML
3 SOLUTION RESPIRATORY (INHALATION)
Status: COMPLETED | OUTPATIENT
Start: 2024-03-18 | End: 2024-03-18

## 2024-03-18 RX ORDER — IPRATROPIUM BROMIDE AND ALBUTEROL SULFATE 2.5; .5 MG/3ML; MG/3ML
9 SOLUTION RESPIRATORY (INHALATION)
Status: COMPLETED | OUTPATIENT
Start: 2024-03-18 | End: 2024-03-18

## 2024-03-18 RX ORDER — PREDNISONE 50 MG/1
50 TABLET ORAL DAILY
Qty: 5 TABLET | Refills: 0 | Status: SHIPPED | OUTPATIENT
Start: 2024-03-18 | End: 2024-03-23

## 2024-03-18 RX ORDER — METHYLPREDNISOLONE SOD SUCC 125 MG
VIAL (EA) INJECTION
Status: COMPLETED
Start: 2024-03-18 | End: 2024-03-18

## 2024-03-18 RX ADMIN — METHYLPREDNISOLONE SODIUM SUCCINATE 125 MG: 125 INJECTION, POWDER, FOR SOLUTION INTRAMUSCULAR; INTRAVENOUS at 07:03

## 2024-03-18 RX ADMIN — Medication 125 MG: at 07:03

## 2024-03-18 RX ADMIN — IPRATROPIUM BROMIDE AND ALBUTEROL SULFATE 9 ML: 2.5; .5 SOLUTION RESPIRATORY (INHALATION) at 07:03

## 2024-03-18 RX ADMIN — IPRATROPIUM BROMIDE AND ALBUTEROL SULFATE 3 ML: 2.5; .5 SOLUTION RESPIRATORY (INHALATION) at 05:03

## 2024-03-18 NOTE — FIRST PROVIDER EVALUATION
"Medical screening examination initiated.  I have conducted a focused provider triage encounter, findings are as follows:    Chief Complaint   Patient presents with    Shortness of Breath     C/o productive cough, fatigue, and wheezing that started yesterday. Unsure of fever at home. Audible wheezing in triage. Out of inhaler at home.      Brief history of present illness:  78 y.o. female presents to the ED with cough, fatigue, wheezing onset yesterday. Out of inhaler at home.     Vitals:    03/18/24 1726   BP: (!) 152/89   BP Location: Left arm   Patient Position: Sitting   Pulse: 66   Resp: (!) 26   Temp: 98.7 °F (37.1 °C)   TempSrc: Oral   SpO2: 98%   Weight: 96.2 kg (212 lb)   Height: 5' 4" (1.626 m)       Pertinent physical exam: Awake, alert, ambulatory, labored respirations, wheezing    Brief workup plan:  labs, EKG, Cxr, swab     Preliminary workup initiated; this workup will be continued and followed by the physician or advanced practice provider that is assigned to the patient when roomed.  "

## 2024-03-19 LAB
OHS QRS DURATION: 94 MS
OHS QTC CALCULATION: 433 MS

## 2024-03-19 NOTE — DISCHARGE INSTRUCTIONS
Thanks for letting use take care of you today! It is our goal to give you courteous care and to keep you comfortable and informed. If you have any questions before you leave I will be happy to try and answer them.     Advice after your visit:  Your visit in the emergency department is NOT definitive care - please follow-up with your primary care doctor and/or specialist within 1-2 days. If you do not have a primary care physician call [phone] to schedule an appointment. Please return if you have any worsening in your condition or if you have any other concerns.    Return to the emergency department if any worsening symptoms including fever, chest pain, difficulty breathing, weakness, numbness, tingling, nausea, vomiting, inability to eat, drink or take your medication, or any other new symptoms or concerns arise.      Please signup for MyChart as noted below in your paperwork to review all labwork, imaging results, and any other incidental findings from today's visit.     If you had radiology exams like an XRAY or CT in the emergency Department the interpreation on them may be preliminary - there may be less time sensitive findings on the reports please obtain these reports within 24 hours from the hospital or by using your out on your mobile phone to access records.  Bring these to your primary care doctor and/or specialist for further review of incidental findings.    Please review any LAB WORK from your visit today with your primary care physician.    If you were prescribed OPIATE PAIN MEDICATION - please understand of these medications can be addictive, you may fill less of the prescription was written for, you do not have to take the full prescription.  You may discard what you do not use.  Please seek help if you feel you are having problems with addiction.  Do not drive or operate heavy machinery if you are taking sedating medications.  Do not mix these medications with alcohol.      If you had a SPLINT  placed in the emergency department if you have severe pain numbness tingling or discoloration of year digits please remove the splint and return to the emergency department for further evaluation as this may represent a sign of compromise to the nerves or blood vessels due to swelling.    If you had SUTURES in the emergency department please have them removed in the prescribed time frame typically within 7-14 days.  You may shower but please do not bathe or swim.  Keep the wounds clean and dry and covered with a clean dressing.  Please return if he have any signs of infection like redness or drainage or pain at the suture site.    Please take the full course of  any ANTIBIOTICS you were prescribed - incomplete courses of antibiotics can cause resistance to antibiotics in the future which will make it difficult to treat any infections you may have.

## 2024-03-19 NOTE — ED PROVIDER NOTES
Encounter Date: 3/18/2024    SCRIBE #1 NOTE: I, Bucky Grijalva, am scribing for, and in the presence of,  Anders Yu IV, MD. I have scribed the following portions of the note - Other sections scribed: HPI,ROS,PE.       History     Chief Complaint   Patient presents with    Shortness of Breath     C/o productive cough, fatigue, and wheezing that started yesterday. Unsure of fever at home. Audible wheezing in triage. Out of inhaler at home.      77 y/o female with PMHx of HTN and tobacco abuse presents to ED c/o SOB onset yesterday. Pt reports associated symptoms of coughing and wheezing. She reports that she ran out of her inhaler at home. She admits to tobacco use. Also complains of L foot pain/wound    The history is provided by the patient. No  was used.     Review of patient's allergies indicates:  No Known Allergies  Past Medical History:   Diagnosis Date    Hypertension      Past Surgical History:   Procedure Laterality Date    BREAST BIOPSY Right     HYSTERECTOMY       Family History   Problem Relation Age of Onset    Depression Mother     Sinusitis Mother     Diabetes Daughter      Social History     Tobacco Use    Smoking status: Some Days     Current packs/day: 0.50     Average packs/day: 0.5 packs/day for 55.5 years (27.8 ttl pk-yrs)     Types: Cigarettes     Start date: 9/11/1968     Passive exposure: Never    Smokeless tobacco: Former   Substance Use Topics    Alcohol use: Not Currently    Drug use: Never     Review of Systems   Constitutional:  Negative for chills and fever.   HENT:  Negative for congestion, rhinorrhea and sore throat.    Eyes:  Negative for visual disturbance.   Respiratory:  Positive for cough, shortness of breath and wheezing.    Cardiovascular:  Negative for chest pain and leg swelling.   Gastrointestinal:  Negative for abdominal pain, nausea and vomiting.   Genitourinary:  Negative for dysuria, hematuria, vaginal bleeding and vaginal discharge.    Musculoskeletal:  Negative for joint swelling.   Skin:  Negative for rash.   Neurological:  Negative for weakness.   Psychiatric/Behavioral:  Negative for confusion.        Physical Exam     Initial Vitals [03/18/24 1726]   BP Pulse Resp Temp SpO2   (!) 152/89 66 (!) 26 98.7 °F (37.1 °C) 98 %      MAP       --         Physical Exam    Nursing note and vitals reviewed.  Constitutional: She is not diaphoretic. No distress.   Well appearing    HENT:   Head: Normocephalic and atraumatic.   Neck: Neck supple.   Normal range of motion.  Cardiovascular:  Normal rate and regular rhythm.           No murmur heard.  Pulmonary/Chest: No respiratory distress. She has wheezes (expiratory).   No increased work of breathing    Abdominal: Abdomen is soft. She exhibits no distension. There is no abdominal tenderness.   Musculoskeletal:      Cervical back: Normal range of motion and neck supple.      Comments: Small callous to sole of L foot, no erythema, drainage. Mild tenderness to palpation     Neurological: She is alert and oriented to person, place, and time. She has normal strength. No cranial nerve deficit or sensory deficit.   Skin: Skin is warm. Capillary refill takes less than 2 seconds.   Psychiatric: She has a normal mood and affect.         ED Course   Procedures  Labs Reviewed   COMPREHENSIVE METABOLIC PANEL - Abnormal; Notable for the following components:       Result Value    Potassium Level 3.4 (*)     Chloride 108 (*)     Carbon Dioxide 22 (*)     Glucose Level 78 (*)     Blood Urea Nitrogen 9.7 (*)     Creatinine 1.11 (*)     Globulin 3.9 (*)     Albumin/Globulin Ratio 0.9 (*)     All other components within normal limits   CBC WITH DIFFERENTIAL - Abnormal; Notable for the following components:    WBC 3.93 (*)     MCHC 32.5 (*)     Neut # 1.77 (*)     All other components within normal limits   B-TYPE NATRIURETIC PEPTIDE - Abnormal; Notable for the following components:    Natriuretic Peptide 361.0 (*)     All  other components within normal limits   COVID/FLU A&B PCR - Normal    Narrative:     The Xpert Xpress SARS-CoV-2/FLU/RSV plus is a rapid, multiplexed real-time PCR test intended for the simultaneous qualitative detection and differentiation of SARS-CoV-2, Influenza A, Influenza B, and respiratory syncytial virus (RSV) viral RNA in either nasopharyngeal swab or nasal swab specimens.         TROPONIN I - Normal   CBC W/ AUTO DIFFERENTIAL    Narrative:     The following orders were created for panel order CBC Auto Differential.  Procedure                               Abnormality         Status                     ---------                               -----------         ------                     CBC with Differential[[id number]]       Abnormal            Final result                 Please view results for these tests on the individual orders.     EKG Readings: (Independently Interpreted)   Initial Reading: No STEMI. Rhythm: Normal Sinus Rhythm. Heart Rate: 64. Ectopy: No Ectopy. ST Segments: Normal ST Segments. T Waves: Normal.   Taken at 1724.       Imaging Results              X-Ray Foot Complete Left (Final result)  Result time 03/18/24 21:06:12      Final result by London Chavarria MD (03/18/24 21:06:12)                   Impression:      No acute osseous abnormality identified.      Electronically signed by: London Chavarria  Date:    03/18/2024  Time:    21:06               Narrative:    EXAMINATION:  XR FOOT COMPLETE 3 VIEW LEFT    CLINICAL HISTORY:  Foot pain.    TECHNIQUE:  Three views    COMPARISON:  None available.    FINDINGS:  Left foot articular surfaces are unremarkable and there is no intrinsic osseous abnormality.  There is no acute fracture, dislocation or arthritic change.  There is demineralization of bones.  Calcaneal enthesophytes.                                       X-Ray Chest 1 View (Final result)  Result time 03/18/24 18:17:04      Final result by Rosalio Ly MD (03/18/24  18:17:04)                   Impression:      No change since previous      Electronically signed by: Eric Ly  Date:    03/18/2024  Time:    18:17               Narrative:    EXAMINATION:  XR CHEST 1 VIEW    CLINICAL HISTORY:  shortness of breath;    TECHNIQUE:  Single frontal view of the chest was performed.    COMPARISON:  04/03/2023    FINDINGS:  There are chronic appearing lung changes seen bilaterally. No evidence of acute infiltrate is seen. No mass is seen. No lesion is seen. No pleural effusion is seen. The heart is upper limits of normal in size..  The aorta is ectatic..  The bones and joints show no acute abnormality.                                       Medications   albuterol-ipratropium 2.5 mg-0.5 mg/3 mL nebulizer solution 3 mL (3 mLs Nebulization Given 3/18/24 1730)   albuterol-ipratropium 2.5 mg-0.5 mg/3 mL nebulizer solution 9 mL (9 mLs Nebulization Given 3/18/24 1915)   methylPREDNISolone sodium succinate injection 125 mg (125 mg Intravenous Given 3/18/24 1930)     Medical Decision Making  77 yo longtime smoker presenting with productive cough, wheezing since yesterday  Patient with expiratory wheezes as above  Will treat with nebs, steroids, obtain workup and reassess     The differential diagnosis includes, but is not limited to:  Judging by the patient's chief complaint and pertinent history, the patient has the following possible differential diagnoses, including but not limited to the following.  Some of these are deemed to be lower likelihood and some more likely based on my physical exam and history combined with possible lab work and/or imaging studies.   Please see the pertinent studies, and refer to the HPI.  Some of these diagnoses will take further evaluation to fully rule out, perhaps as an outpatient and the patient was encouraged to follow up when discharged for more comprehensive evaluation.    ACS, pneumonia, COVID/Flu, congestive heart failure, asthma, COPD, pleural  effusion, pulmonary edema, acute bronchitis, PE, pneumothorax, hemothorax, aortic dissection, electrolyte abnormalities, anemia, anxiety        Problems Addressed:  Acute bronchitis, unspecified organism: acute illness or injury that poses a threat to life or bodily functions  Shortness of breath: acute illness or injury that poses a threat to life or bodily functions  Wheezing: acute illness or injury that poses a threat to life or bodily functions    Amount and/or Complexity of Data Reviewed  Labs: ordered.  Radiology: ordered and independent interpretation performed.     Details: CXR - no obvious infiltrates, consolidations, pleural effusions, or pneumothorax.      ECG/medicine tests: ordered and independent interpretation performed.    Risk  Prescription drug management.            Scribe Attestation:   Scribe #1: I performed the above scribed service and the documentation accurately describes the services I performed. I attest to the accuracy of the note.    Attending Attestation:           Physician Attestation for Scribe:  Physician Attestation Statement for Scribe #1: I, Anders Yu IV, MD, reviewed documentation, as scribed by Bucky Grijalva in my presence, and it is both accurate and complete.             ED Course as of 03/18/24 2137   Mon Mar 18, 2024   2026 Patient lungs clear, will treat with inhalers/steroids/abx to go home with. She does now complain about L foot pain, has scab/callous to L foot. Will XR.  [AC]   2049 No fx, foreign body on XR, will discharge at this time as planned  [AC]      ED Course User Index  [AC] Anders Yu IV, MD                             Clinical Impression:  Final diagnoses:  [R06.02] Shortness of breath (Primary)  [M79.672] Left foot pain  [J20.9] Acute bronchitis, unspecified organism  [R06.2] Wheezing          ED Disposition Condition    Discharge Stable          ED Prescriptions       Medication Sig Dispense Start Date End Date Auth. Provider     albuterol-ipratropium (DUO-NEB) 2.5 mg-0.5 mg/3 mL nebulizer solution Take 3 mLs by nebulization every 6 (six) hours as needed for Wheezing. Rescue 75 mL 3/18/2024 3/18/2025 Anders Yu IV, MD    predniSONE (DELTASONE) 50 MG Tab Take 1 tablet (50 mg total) by mouth once daily. for 5 days 5 tablet 3/18/2024 3/23/2024 Anders Yu IV, MD    doxycycline (VIBRA-TABS) 100 MG tablet Take 1 tablet (100 mg total) by mouth 2 (two) times daily. for 7 days 14 tablet 3/18/2024 3/25/2024 Anders Yu IV, MD          Follow-up Information       Follow up With Specialties Details Why Contact Info    [facility] - Emergency Dept Emergency Medicine Go to  If symptoms worsen [address] [address] [facility] [id number] [phone]    Nikole Gupta MD Family Medicine Schedule an appointment as soon as possible for a visit   [address] [facility] [address] [phone]               Anders Yu IV, MD  03/18/24 7616

## 2024-04-08 RX ORDER — VIT C/E/ZN/COPPR/LUTEIN/ZEAXAN 250MG-90MG
1000 CAPSULE ORAL DAILY
Qty: 90 CAPSULE | Refills: 3 | Status: SHIPPED | OUTPATIENT
Start: 2024-04-08 | End: 2024-06-12 | Stop reason: SDUPTHER

## 2024-05-06 NOTE — PROGRESS NOTES
Date of Service: 1/29/2024    Attending Attestation: Patient discussed with geriatric fellow Dr. Kim . The chart was reviewed thoroughly including pertinent vitals, labs, imaging, medications, prior notes, and consultant/specialist recommendations.  I participated in the management of the patient, examined the patient, reviewed the summary of the plan, and was immediately available at all times throughout the encounter. Services were furnished in a primary care center located in the outpatient department of a teaching hospital. I agree with the fellow's findings and plan as documented in their note.    Nikole Gupta MD  Attending - Family Medicine / Geriatric Medicine  [facility], [facility]

## 2024-06-12 ENCOUNTER — OFFICE VISIT (OUTPATIENT)
Dept: FAMILY MEDICINE | Facility: CLINIC | Age: 79
End: 2024-06-12
Payer: COMMERCIAL

## 2024-06-12 VITALS
HEART RATE: 52 BPM | SYSTOLIC BLOOD PRESSURE: 146 MMHG | BODY MASS INDEX: 26.67 KG/M2 | TEMPERATURE: 98 F | RESPIRATION RATE: 20 BRPM | OXYGEN SATURATION: 100 % | WEIGHT: 156.19 LBS | HEIGHT: 64 IN | DIASTOLIC BLOOD PRESSURE: 84 MMHG

## 2024-06-12 DIAGNOSIS — F32.A DEPRESSION, UNSPECIFIED DEPRESSION TYPE: ICD-10-CM

## 2024-06-12 DIAGNOSIS — R92.8 ABNORMAL FINDINGS ON DIAGNOSTIC IMAGING OF BREAST: ICD-10-CM

## 2024-06-12 DIAGNOSIS — J41.0 SIMPLE CHRONIC BRONCHITIS: ICD-10-CM

## 2024-06-12 DIAGNOSIS — I10 PRIMARY HYPERTENSION: Primary | ICD-10-CM

## 2024-06-12 DIAGNOSIS — J30.9 ALLERGIC RHINITIS, UNSPECIFIED SEASONALITY, UNSPECIFIED TRIGGER: ICD-10-CM

## 2024-06-12 DIAGNOSIS — M85.80 OSTEOPENIA, UNSPECIFIED LOCATION: ICD-10-CM

## 2024-06-12 DIAGNOSIS — G47.00 INSOMNIA, UNSPECIFIED TYPE: ICD-10-CM

## 2024-06-12 LAB
25(OH)D3+25(OH)D2 SERPL-MCNC: 44 NG/ML (ref 30–80)
ANION GAP SERPL CALC-SCNC: 9 MEQ/L
BUN SERPL-MCNC: 4.7 MG/DL (ref 9.8–20.1)
CALCIUM SERPL-MCNC: 9.9 MG/DL (ref 8.4–10.2)
CHLORIDE SERPL-SCNC: 107 MMOL/L (ref 98–107)
CO2 SERPL-SCNC: 22 MMOL/L (ref 23–31)
CREAT SERPL-MCNC: 0.98 MG/DL (ref 0.55–1.02)
CREAT/UREA NIT SERPL: 5
GFR SERPLBLD CREATININE-BSD FMLA CKD-EPI: 60 ML/MIN/1.73/M2
GLUCOSE SERPL-MCNC: 111 MG/DL (ref 82–115)
HCV AB SERPL QL IA: NONREACTIVE
POTASSIUM SERPL-SCNC: 4.2 MMOL/L (ref 3.5–5.1)
SODIUM SERPL-SCNC: 138 MMOL/L (ref 136–145)

## 2024-06-12 PROCEDURE — 86803 HEPATITIS C AB TEST: CPT | Performed by: FAMILY MEDICINE

## 2024-06-12 PROCEDURE — 36415 COLL VENOUS BLD VENIPUNCTURE: CPT | Performed by: FAMILY MEDICINE

## 2024-06-12 PROCEDURE — 82306 VITAMIN D 25 HYDROXY: CPT | Performed by: FAMILY MEDICINE

## 2024-06-12 PROCEDURE — 99214 OFFICE O/P EST MOD 30 MIN: CPT | Mod: PBBFAC | Performed by: FAMILY MEDICINE

## 2024-06-12 PROCEDURE — 80048 BASIC METABOLIC PNL TOTAL CA: CPT | Performed by: FAMILY MEDICINE

## 2024-06-12 RX ORDER — TRIAMCINOLONE ACETONIDE 1 MG/G
CREAM TOPICAL 2 TIMES DAILY
Qty: 28.4 G | Refills: 0 | Status: SHIPPED | OUTPATIENT
Start: 2024-06-12 | End: 2024-06-26

## 2024-06-12 RX ORDER — TRIAMCINOLONE ACETONIDE 1 MG/G
CREAM TOPICAL 2 TIMES DAILY
Qty: 28.4 G | Refills: 0 | Status: CANCELLED | OUTPATIENT
Start: 2024-06-12 | End: 2024-06-26

## 2024-06-12 RX ORDER — ALBUTEROL SULFATE 90 UG/1
1-2 AEROSOL, METERED RESPIRATORY (INHALATION) EVERY 6 HOURS PRN
Qty: 1 G | Refills: 2 | Status: CANCELLED | OUTPATIENT
Start: 2024-06-12

## 2024-06-12 RX ORDER — ALBUTEROL SULFATE 90 UG/1
1-2 AEROSOL, METERED RESPIRATORY (INHALATION) EVERY 6 HOURS PRN
Qty: 1 G | Refills: 2 | Status: SHIPPED | OUTPATIENT
Start: 2024-06-12

## 2024-06-12 RX ORDER — VIT C/E/ZN/COPPR/LUTEIN/ZEAXAN 250MG-90MG
1000 CAPSULE ORAL DAILY
Qty: 90 CAPSULE | Refills: 3 | Status: SHIPPED | OUTPATIENT
Start: 2024-06-12

## 2024-06-12 RX ORDER — TRAZODONE HYDROCHLORIDE 50 MG/1
50 TABLET ORAL NIGHTLY
Qty: 90 TABLET | Refills: 1 | Status: SHIPPED | OUTPATIENT
Start: 2024-06-12

## 2024-06-12 RX ORDER — LISINOPRIL 40 MG/1
40 TABLET ORAL DAILY
Qty: 90 TABLET | Refills: 1 | Status: SHIPPED | OUTPATIENT
Start: 2024-06-12

## 2024-06-12 RX ORDER — ESCITALOPRAM OXALATE 10 MG/1
10 TABLET ORAL DAILY
Qty: 90 TABLET | Refills: 1 | Status: SHIPPED | OUTPATIENT
Start: 2024-06-12
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
## 2024-06-12 NOTE — PATIENT INSTRUCTIONS
Allergy meds - zyrtec /cetirizine one pill daily in the morning     Flonase - 1 spray each nostril twice a day     Visine for Allergy eye drops - 2-3 drops each eye, twice a day    Try this for 2 weeks until symptoms improve.     
No

## 2024-12-21 ENCOUNTER — HOSPITAL ENCOUNTER (EMERGENCY)
Facility: HOSPITAL | Age: 79
Discharge: HOME | End: 2024-12-21
Attending: INTERNAL MEDICINE
Payer: COMMERCIAL

## 2024-12-21 VITALS
TEMPERATURE: 98 F | HEART RATE: 54 BPM | BODY MASS INDEX: 26.69 KG/M2 | OXYGEN SATURATION: 99 % | DIASTOLIC BLOOD PRESSURE: 91 MMHG | RESPIRATION RATE: 18 BRPM | HEIGHT: 64 IN | WEIGHT: 156.31 LBS | SYSTOLIC BLOOD PRESSURE: 187 MMHG

## 2024-12-21 DIAGNOSIS — N30.00 ACUTE CYSTITIS WITHOUT HEMATURIA: ICD-10-CM

## 2024-12-21 DIAGNOSIS — M19.041 PRIMARY OSTEOARTHRITIS OF BOTH HANDS: ICD-10-CM

## 2024-12-21 DIAGNOSIS — I10 UNCONTROLLED HYPERTENSION: Primary | ICD-10-CM

## 2024-12-21 DIAGNOSIS — R55 SYNCOPE: ICD-10-CM

## 2024-12-21 DIAGNOSIS — M19.042 PRIMARY OSTEOARTHRITIS OF BOTH HANDS: ICD-10-CM

## 2024-12-21 LAB
ALBUMIN SERPL-MCNC: 3.6 G/DL (ref 3.4–4.8)
ALBUMIN/GLOB SERPL: 0.9 RATIO (ref 1.1–2)
ALP SERPL-CCNC: 91 UNIT/L (ref 40–150)
ALT SERPL-CCNC: 12 UNIT/L (ref 0–55)
ANION GAP SERPL CALC-SCNC: -4 MEQ/L
AST SERPL-CCNC: 22 UNIT/L (ref 5–34)
BACTERIA #/AREA URNS AUTO: ABNORMAL /HPF
BASOPHILS # BLD AUTO: 0.01 X10(3)/MCL
BASOPHILS NFR BLD AUTO: 0.3 %
BILIRUB SERPL-MCNC: 0.5 MG/DL
BILIRUB UR QL STRIP.AUTO: NEGATIVE
BUN SERPL-MCNC: 8.5 MG/DL (ref 9.8–20.1)
CALCIUM SERPL-MCNC: 9.9 MG/DL (ref 8.4–10.2)
CHLORIDE SERPL-SCNC: 114 MMOL/L (ref 98–107)
CLARITY UR: CLEAR
CO2 SERPL-SCNC: 26 MMOL/L (ref 23–31)
COLOR UR AUTO: ABNORMAL
CREAT SERPL-MCNC: 0.87 MG/DL (ref 0.55–1.02)
CREAT/UREA NIT SERPL: 10
EOSINOPHIL # BLD AUTO: 0.02 X10(3)/MCL (ref 0–0.9)
EOSINOPHIL NFR BLD AUTO: 0.5 %
ERYTHROCYTE [DISTWIDTH] IN BLOOD BY AUTOMATED COUNT: 15.9 % (ref 11.5–17)
FLUAV AG UPPER RESP QL IA.RAPID: NOT DETECTED
FLUBV AG UPPER RESP QL IA.RAPID: NOT DETECTED
GFR SERPLBLD CREATININE-BSD FMLA CKD-EPI: >60 ML/MIN/1.73/M2
GLOBULIN SER-MCNC: 4 GM/DL (ref 2.4–3.5)
GLUCOSE SERPL-MCNC: 78 MG/DL (ref 70–110)
GLUCOSE SERPL-MCNC: 78 MG/DL (ref 82–115)
GLUCOSE UR QL STRIP: NORMAL
HCT VFR BLD AUTO: 36.2 % (ref 37–47)
HGB BLD-MCNC: 12 G/DL (ref 12–16)
HGB UR QL STRIP: NEGATIVE
HYALINE CASTS #/AREA URNS LPF: ABNORMAL /LPF
IMM GRANULOCYTES # BLD AUTO: 0.01 X10(3)/MCL (ref 0–0.04)
IMM GRANULOCYTES NFR BLD AUTO: 0.3 %
KETONES UR QL STRIP: NEGATIVE
LEUKOCYTE ESTERASE UR QL STRIP: 25
LYMPHOCYTES # BLD AUTO: 1 X10(3)/MCL (ref 0.6–4.6)
LYMPHOCYTES NFR BLD AUTO: 25.6 %
MCH RBC QN AUTO: 30.7 PG (ref 27–31)
MCHC RBC AUTO-ENTMCNC: 33.1 G/DL (ref 33–36)
MCV RBC AUTO: 92.6 FL (ref 80–94)
MONOCYTES # BLD AUTO: 0.6 X10(3)/MCL (ref 0.1–1.3)
MONOCYTES NFR BLD AUTO: 15.3 %
NEUTROPHILS # BLD AUTO: 2.27 X10(3)/MCL (ref 2.1–9.2)
NEUTROPHILS NFR BLD AUTO: 58 %
NITRITE UR QL STRIP: ABNORMAL
NRBC BLD AUTO-RTO: 0 %
OHS QRS DURATION: 88 MS
OHS QTC CALCULATION: 419 MS
PH UR STRIP: 7 [PH]
PLATELET # BLD AUTO: 262 X10(3)/MCL (ref 130–400)
PMV BLD AUTO: 9.2 FL (ref 7.4–10.4)
POTASSIUM SERPL-SCNC: 3.7 MMOL/L (ref 3.5–5.1)
PROT SERPL-MCNC: 7.6 GM/DL (ref 5.8–7.6)
PROT UR QL STRIP: NEGATIVE
RBC # BLD AUTO: 3.91 X10(6)/MCL (ref 4.2–5.4)
RBC #/AREA URNS AUTO: ABNORMAL /HPF
RSV A 5' UTR RNA NPH QL NAA+PROBE: NOT DETECTED
SARS-COV-2 RNA RESP QL NAA+PROBE: NOT DETECTED
SODIUM SERPL-SCNC: 136 MMOL/L (ref 136–145)
SP GR UR STRIP.AUTO: 1.01 (ref 1–1.03)
SQUAMOUS #/AREA URNS LPF: ABNORMAL /HPF
UROBILINOGEN UR STRIP-ACNC: NORMAL
WBC # BLD AUTO: 3.91 X10(3)/MCL (ref 4.5–11.5)
WBC #/AREA URNS AUTO: ABNORMAL /HPF

## 2024-12-21 PROCEDURE — 85025 COMPLETE CBC W/AUTO DIFF WBC: CPT | Performed by: INTERNAL MEDICINE

## 2024-12-21 PROCEDURE — 82962 GLUCOSE BLOOD TEST: CPT

## 2024-12-21 PROCEDURE — 0241U COVID/RSV/FLU A&B PCR: CPT | Performed by: INTERNAL MEDICINE

## 2024-12-21 PROCEDURE — 80053 COMPREHEN METABOLIC PANEL: CPT | Performed by: INTERNAL MEDICINE

## 2024-12-21 PROCEDURE — 25000003 PHARM REV CODE 250: Performed by: INTERNAL MEDICINE

## 2024-12-21 PROCEDURE — 99284 EMERGENCY DEPT VISIT MOD MDM: CPT | Mod: 25

## 2024-12-21 PROCEDURE — 93005 ELECTROCARDIOGRAM TRACING: CPT

## 2024-12-21 PROCEDURE — 81001 URINALYSIS AUTO W/SCOPE: CPT | Performed by: INTERNAL MEDICINE

## 2024-12-21 RX ORDER — AMLODIPINE BESYLATE 5 MG/1
5 TABLET ORAL
Status: COMPLETED | OUTPATIENT
Start: 2024-12-21 | End: 2024-12-21

## 2024-12-21 RX ORDER — AMOXICILLIN AND CLAVULANATE POTASSIUM 500; 125 MG/1; MG/1
1 TABLET, FILM COATED ORAL 2 TIMES DAILY
Qty: 14 TABLET | Refills: 0 | Status: SHIPPED | OUTPATIENT
Start: 2024-12-21 | End: 2024-12-28

## 2024-12-21 RX ORDER — LISINOPRIL 10 MG/1
40 TABLET ORAL
Status: COMPLETED | OUTPATIENT
Start: 2024-12-21 | End: 2024-12-21

## 2024-12-21 RX ORDER — MELOXICAM 7.5 MG/1
7.5 TABLET ORAL DAILY
Qty: 14 TABLET | Refills: 0 | Status: SHIPPED | OUTPATIENT
Start: 2024-12-21 | End: 2025-01-04

## 2024-12-21 RX ADMIN — AMLODIPINE BESYLATE 5 MG: 5 TABLET ORAL at 03:12

## 2024-12-21 RX ADMIN — LISINOPRIL 40 MG: 10 TABLET ORAL at 03:12

## 2024-12-21 NOTE — ED PROVIDER NOTES
"Encounter Date: 12/21/2024       History     Chief Complaint   Patient presents with    Loss of Consciousness     C/o was shopping with daughter and "PASSED OUT" . C/o abdominal cramping. Stated just got weak after not taking meds today. Alert and aware of where she is.      Presents after an episode of near syncope at home. States have been feeling week and febrile, with muscle aches. States think has the flu. Hx of HTN, but did not took her medication today. Denies sick contacts, diarrhea or vomiting, no chest pain or SOB. Pt states also having finger pain.    The history is provided by the patient.     Review of patient's allergies indicates:  No Known Allergies  Past Medical History:   Diagnosis Date    Hypertension      Past Surgical History:   Procedure Laterality Date    BREAST BIOPSY Right     HYSTERECTOMY       Family History   Problem Relation Name Age of Onset    Depression Mother      Sinusitis Mother      Diabetes Daughter       Social History     Tobacco Use    Smoking status: Some Days     Current packs/day: 0.50     Average packs/day: 0.5 packs/day for 56.3 years (28.1 ttl pk-yrs)     Types: Cigarettes     Start date: 9/11/1968     Passive exposure: Never    Smokeless tobacco: Former   Substance Use Topics    Alcohol use: Not Currently    Drug use: Never     Review of Systems   Constitutional:  Positive for fever.   Musculoskeletal:  Positive for arthralgias and myalgias.   Neurological:  Positive for dizziness and weakness.   All other systems reviewed and are negative.      Physical Exam     Initial Vitals [12/21/24 1338]   BP Pulse Resp Temp SpO2   (!) 173/84 (!) 52 20 98.1 °F (36.7 °C) 100 %      MAP       --         Physical Exam    Nursing note and vitals reviewed.          ED Course   Procedures  Labs Reviewed   URINALYSIS, REFLEX TO URINE CULTURE - Abnormal       Result Value    Color, UA Light-Yellow      Appearance, UA Clear      Specific Gravity, UA 1.006      pH, UA 7.0      Protein, UA " Negative      Glucose, UA Normal      Ketones, UA Negative      Blood, UA Negative      Bilirubin, UA Negative      Urobilinogen, UA Normal      Nitrites, UA 2+ (*)     Leukocyte Esterase, UA 25 (*)     RBC, UA None Seen      WBC, UA 6-10 (*)     Bacteria, UA Occasional (*)     Squamous Epithelial Cells, UA None Seen      Hyaline Casts, UA None Seen     COMPREHENSIVE METABOLIC PANEL - Abnormal    Sodium 136      Potassium 3.7      Chloride 114 (*)     CO2 26      Glucose 78 (*)     Blood Urea Nitrogen 8.5 (*)     Creatinine 0.87      Calcium 9.9      Protein Total 7.6      Albumin 3.6      Globulin 4.0 (*)     Albumin/Globulin Ratio 0.9 (*)     Bilirubin Total 0.5      ALP 91      ALT 12      AST 22      eGFR >60      Anion Gap -4.0      BUN/Creatinine Ratio 10     CBC WITH DIFFERENTIAL - Abnormal    WBC 3.91 (*)     RBC 3.91 (*)     Hgb 12.0      Hct 36.2 (*)     MCV 92.6      MCH 30.7      MCHC 33.1      RDW 15.9      Platelet 262      MPV 9.2      Neut % 58.0      Lymph % 25.6      Mono % 15.3      Eos % 0.5      Basophil % 0.3      Lymph # 1.00      Neut # 2.27      Mono # 0.60      Eos # 0.02      Baso # 0.01      IG# 0.01      IG% 0.3      NRBC% 0.0     COVID/RSV/FLU A&B PCR - Normal    Influenza A PCR Not Detected      Influenza B PCR Not Detected      Respiratory Syncytial Virus PCR Not Detected      SARS-CoV-2 PCR Not Detected      Narrative:     The Xpert Xpress SARS-CoV-2/FLU/RSV plus is a rapid, multiplexed real-time PCR test intended for the simultaneous qualitative detection and differentiation of SARS-CoV-2, Influenza A, Influenza B, and respiratory syncytial virus (RSV) viral RNA in either nasopharyngeal swab or nasal swab specimens.         CBC W/ AUTO DIFFERENTIAL    Narrative:     The following orders were created for panel order CBC auto differential.  Procedure                               Abnormality         Status                     ---------                               -----------          ------                     CBC with Differential[[id number]]       Abnormal            Final result                 Please view results for these tests on the individual orders.   POCT GLUCOSE, HAND-HELD DEVICE    POC Glucose 78       EKG Readings: (Independently Interpreted)   Initial Reading: No STEMI. Rhythm: Sinus Bradycardia. Heart Rate: 48. Ectopy: No Ectopy. Conduction: Normal. ST Segments: Non-Specific ST Segment Depression. T Waves: Normal. Clinical Impression: Sinus Bradycardia     ECG Results              EKG 12-lead (In process)        Collection Time Result Time QRS Duration [facility] QTC Calculation    12/21/24 13:41:22 12/21/24 13:48:40 94 408                     In process by Interface, Lab In [facility] (12/21/24 13:48:50)                   Narrative:    Test Reason : R55,    Vent. Rate :  49 BPM     Atrial Rate :  49 BPM     P-R Int : 194 ms          QRS Dur :  94 ms      QT Int : 452 ms       P-R-T Axes :  76   6 -23 degrees    QTcB Int : 408 ms    Sinus bradycardia with Premature atrial complexes with Aberrant conduction  Nonspecific ST and T wave abnormality  Abnormal ECG  When compared with ECG of 18-Mar-2024 17:24,  Aberrant conduction is now Present  The axis Shifted right    Referred By:            Confirmed By:                                   Imaging Results    None          Medications   lisinopriL tablet 40 mg (40 mg Oral Given 12/21/24 1522)   amLODIPine tablet 5 mg (5 mg Oral Given 12/21/24 1521)     Medical Decision Making  Amount and/or Complexity of Data Reviewed  Labs: ordered. Decision-making details documented in ED Course.  ECG/medicine tests: ordered and independent interpretation performed. Decision-making details documented in ED Course.    Risk  Prescription drug management.      Additional MDM:   Differential Diagnosis:   Hypothyroidism, medication side effect, orthostatic weakness, kidney failure, stroke, among others                                      Clinical  Impression:  Final diagnoses:  [R55] Syncope  [I10] Uncontrolled hypertension (Primary)  [N30.00] Acute cystitis without hematuria  [M19.041, M19.042] Primary osteoarthritis of both hands          ED Disposition Condition    Discharge Stable          ED Prescriptions       Medication Sig Dispense Start Date End Date Auth. Provider    meloxicam (MOBIC) 7.5 MG tablet Take 1 tablet (7.5 mg total) by mouth once daily. for 14 days 14 tablet 12/21/2024 1/4/2025 Edmar Kohler MD    amoxicillin-clavulanate 500-125mg (AUGMENTIN) 500-125 mg Tab Take 1 tablet (500 mg total) by mouth 2 (two) times daily. for 7 days 14 tablet 12/21/2024 12/28/2024 Edmar Kohler MD          Follow-up Information       Follow up With Specialties Details Why Contact Info    Nikole Gupta MD Family Medicine Schedule an appointment as soon as possible for a visit in 2 weeks  [address] [facility] [address]  [phone]      [facility] Emergency Medicine Go to  If symptoms worsen [address] [facility] [id number] [phone]             Edmar Kohler MD  12/21/24 1700

## 2024-12-22 LAB — POCT GLUCOSE: 78 MG/DL (ref 70–110)

## 2024-12-24 LAB
OHS QRS DURATION: 88 MS
OHS QRS DURATION: 94 MS
OHS QTC CALCULATION: 408 MS
OHS QTC CALCULATION: 419 MS

## 2024-12-26 RX ORDER — LISINOPRIL 40 MG/1
40 TABLET ORAL DAILY
Qty: 90 TABLET | Refills: 1 | Status: SHIPPED | OUTPATIENT
Start: 2024-12-26

## 2025-04-14 ENCOUNTER — OFFICE VISIT (OUTPATIENT)
Dept: FAMILY MEDICINE | Facility: CLINIC | Age: 80
End: 2025-04-14
Payer: COMMERCIAL

## 2025-04-14 VITALS
TEMPERATURE: 98 F | RESPIRATION RATE: 20 BRPM | BODY MASS INDEX: 25.64 KG/M2 | WEIGHT: 150.19 LBS | OXYGEN SATURATION: 100 % | SYSTOLIC BLOOD PRESSURE: 156 MMHG | DIASTOLIC BLOOD PRESSURE: 82 MMHG | HEIGHT: 64 IN | HEART RATE: 61 BPM

## 2025-04-14 DIAGNOSIS — R53.83 FATIGUE, UNSPECIFIED TYPE: Primary | ICD-10-CM

## 2025-04-14 DIAGNOSIS — K21.9 GASTROESOPHAGEAL REFLUX DISEASE, UNSPECIFIED WHETHER ESOPHAGITIS PRESENT: ICD-10-CM

## 2025-04-14 DIAGNOSIS — R92.1 BREAST CALCIFICATIONS ON MAMMOGRAM: ICD-10-CM

## 2025-04-14 DIAGNOSIS — F33.1 MODERATE EPISODE OF RECURRENT MAJOR DEPRESSIVE DISORDER: ICD-10-CM

## 2025-04-14 DIAGNOSIS — Z79.899 OTHER LONG TERM (CURRENT) DRUG THERAPY: ICD-10-CM

## 2025-04-14 DIAGNOSIS — I10 PRIMARY HYPERTENSION: ICD-10-CM

## 2025-04-14 DIAGNOSIS — M85.88 OSTEOPENIA OF OTHER SITE: ICD-10-CM

## 2025-04-14 DIAGNOSIS — N60.21 SCLEROSING ADENOSIS OF RIGHT BREAST: ICD-10-CM

## 2025-04-14 DIAGNOSIS — M85.9 DISORDER OF BONE DENSITY AND STRUCTURE, UNSPECIFIED: ICD-10-CM

## 2025-04-14 DIAGNOSIS — R29.6 FALLS: ICD-10-CM

## 2025-04-14 DIAGNOSIS — F51.01 PRIMARY INSOMNIA: ICD-10-CM

## 2025-04-14 DIAGNOSIS — R41.89 COGNITIVE IMPAIRMENT: ICD-10-CM

## 2025-04-14 DIAGNOSIS — E16.2 HYPOGLYCEMIA: ICD-10-CM

## 2025-04-14 LAB
25(OH)D3+25(OH)D2 SERPL-MCNC: 46 NG/ML (ref 30–80)
ALBUMIN SERPL-MCNC: 3.7 G/DL (ref 3.4–4.8)
ALBUMIN/GLOB SERPL: 0.8 RATIO (ref 1.1–2)
ALP SERPL-CCNC: 102 UNIT/L (ref 40–150)
ALT SERPL-CCNC: 10 UNIT/L (ref 0–55)
ANION GAP SERPL CALC-SCNC: 5 MEQ/L
AST SERPL-CCNC: 20 UNIT/L (ref 11–45)
BASOPHILS # BLD AUTO: 0.02 X10(3)/MCL
BASOPHILS NFR BLD AUTO: 0.6 %
BILIRUB SERPL-MCNC: 0.6 MG/DL
BUN SERPL-MCNC: 9 MG/DL (ref 9.8–20.1)
CALCIUM SERPL-MCNC: 10.2 MG/DL (ref 8.4–10.2)
CHLORIDE SERPL-SCNC: 107 MMOL/L (ref 98–107)
CHOLEST SERPL-MCNC: 177 MG/DL
CHOLEST/HDLC SERPL: 3 {RATIO} (ref 0–5)
CO2 SERPL-SCNC: 27 MMOL/L (ref 23–31)
CREAT SERPL-MCNC: 0.9 MG/DL (ref 0.55–1.02)
CREAT/UREA NIT SERPL: 10
EOSINOPHIL # BLD AUTO: 0.04 X10(3)/MCL (ref 0–0.9)
EOSINOPHIL NFR BLD AUTO: 1.2 %
ERYTHROCYTE [DISTWIDTH] IN BLOOD BY AUTOMATED COUNT: 15.3 % (ref 11.5–17)
EST. AVERAGE GLUCOSE BLD GHB EST-MCNC: 105.4 MG/DL
GFR SERPLBLD CREATININE-BSD FMLA CKD-EPI: >60 ML/MIN/1.73/M2
GLOBULIN SER-MCNC: 4.5 GM/DL (ref 2.4–3.5)
GLUCOSE SERPL-MCNC: 85 MG/DL (ref 82–115)
HBA1C MFR BLD: 5.3 %
HCT VFR BLD AUTO: 38 % (ref 37–47)
HDLC SERPL-MCNC: 56 MG/DL (ref 35–60)
HGB BLD-MCNC: 12.9 G/DL (ref 12–16)
IMM GRANULOCYTES # BLD AUTO: 0.01 X10(3)/MCL (ref 0–0.04)
IMM GRANULOCYTES NFR BLD AUTO: 0.3 %
LDLC SERPL CALC-MCNC: 109 MG/DL (ref 50–140)
LYMPHOCYTES # BLD AUTO: 0.94 X10(3)/MCL (ref 0.6–4.6)
LYMPHOCYTES NFR BLD AUTO: 28 %
MCH RBC QN AUTO: 30.5 PG (ref 27–31)
MCHC RBC AUTO-ENTMCNC: 33.9 G/DL (ref 33–36)
MCV RBC AUTO: 89.8 FL (ref 80–94)
MONOCYTES # BLD AUTO: 0.61 X10(3)/MCL (ref 0.1–1.3)
MONOCYTES NFR BLD AUTO: 18.2 %
NEUTROPHILS # BLD AUTO: 1.74 X10(3)/MCL (ref 2.1–9.2)
NEUTROPHILS NFR BLD AUTO: 51.7 %
NRBC BLD AUTO-RTO: 0 %
PLATELET # BLD AUTO: 256 X10(3)/MCL (ref 130–400)
PMV BLD AUTO: 9.4 FL (ref 7.4–10.4)
POTASSIUM SERPL-SCNC: 4.5 MMOL/L (ref 3.5–5.1)
PROT SERPL-MCNC: 8.2 GM/DL (ref 5.8–7.6)
RBC # BLD AUTO: 4.23 X10(6)/MCL (ref 4.2–5.4)
SODIUM SERPL-SCNC: 139 MMOL/L (ref 136–145)
TRIGL SERPL-MCNC: 62 MG/DL (ref 37–140)
TSH SERPL-ACNC: 1.42 UIU/ML (ref 0.35–4.94)
VIT B12 SERPL-MCNC: 396 PG/ML (ref 213–816)
VLDLC SERPL CALC-MCNC: 12 MG/DL
WBC # BLD AUTO: 3.36 X10(3)/MCL (ref 4.5–11.5)

## 2025-04-14 PROCEDURE — 36415 COLL VENOUS BLD VENIPUNCTURE: CPT | Performed by: FAMILY MEDICINE

## 2025-04-14 PROCEDURE — 99215 OFFICE O/P EST HI 40 MIN: CPT | Mod: PBBFAC | Performed by: FAMILY MEDICINE

## 2025-04-14 PROCEDURE — 82607 VITAMIN B-12: CPT | Performed by: FAMILY MEDICINE

## 2025-04-14 PROCEDURE — 85025 COMPLETE CBC W/AUTO DIFF WBC: CPT | Performed by: FAMILY MEDICINE

## 2025-04-14 PROCEDURE — 80061 LIPID PANEL: CPT | Performed by: FAMILY MEDICINE

## 2025-04-14 PROCEDURE — 83036 HEMOGLOBIN GLYCOSYLATED A1C: CPT | Performed by: FAMILY MEDICINE

## 2025-04-14 PROCEDURE — 84443 ASSAY THYROID STIM HORMONE: CPT | Performed by: FAMILY MEDICINE

## 2025-04-14 PROCEDURE — 80053 COMPREHEN METABOLIC PANEL: CPT | Performed by: FAMILY MEDICINE

## 2025-04-14 PROCEDURE — 82306 VITAMIN D 25 HYDROXY: CPT | Performed by: FAMILY MEDICINE

## 2025-04-14 RX ORDER — TRAZODONE HYDROCHLORIDE 50 MG/1
25 TABLET ORAL NIGHTLY
Qty: 30 TABLET | Refills: 1 | Status: SHIPPED | OUTPATIENT
Start: 2025-04-14

## 2025-04-14 RX ORDER — PANTOPRAZOLE SODIUM 20 MG/1
20 TABLET, DELAYED RELEASE ORAL NIGHTLY
Qty: 30 TABLET | Refills: 3 | Status: SHIPPED | OUTPATIENT
Start: 2025-04-14

## 2025-04-14 RX ORDER — ESCITALOPRAM OXALATE 20 MG/1
20 TABLET ORAL DAILY
Qty: 30 TABLET | Refills: 3 | Status: SHIPPED | OUTPATIENT
Start: 2025-04-14

## 2025-04-14 NOTE — PROGRESS NOTES
"[facility]    DOS: 4/14/2025      Subjective:  Chief Complaint:    Chief Complaint   Patient presents with    Follow-up       History of Present Illness:  Dolores Ta is a 79 y.o. female with a PMH of HTN, OA, depression, cognitive impairment, insomnia, chronic tobacco use, and osteopenia who presents accompanied by her sister in law today for: Follow up of Chronic Conditions: OA, HTN, breast nodule .      Fatigue:  Patient admits to daytime somnolence. She denies known snoring but admits to coughing and an episode of vomiting while asleep. She has lost about 6lbs since last visit in 01/2025.  Has had a fall a few months ago in [facility]. Patient has 2 walkers but states she doesn't use them. She is afraid to fall and has been self isolating. She admits to ruminating about deaths of multiple family members and states she "feels alone in the world" despite support from her daughter and sister in law.     HTN:  Currently treated with lisinopril 40 mg daily.  BP upon arrival: 156/82. Patient took antihypertensive upon arrival in clinic.  Patient denies SOB, chest pain, headaches or blurred vision.    OA:  Patient being treated with Mobic 7.5 mg daily and Tylenol.  Today she states she occasionally has bilateral arm pain and back pain.  She states she has been "lazy" and walking less which she believes is contributing.    Right breast calcifications:  Hx of calcifications s/p biopsy (04/26/2023) revealing atypical apocrine adenosis, sclerosing adenosis and associated microcalcifications. No evidence of malignancy.    Patient was scheduled for repeat diagnostic MMG and breast US but did not show for appointment.  MMG reordered today. Encouraged compliance with follow up MMG.    MDD/Insomnia:  Currently being treated with Lexapro 10 mg daily and trazodone 50 mg qhs.  Admits to increased fatigue, difficulty with daytime somnolence.   She is sleeping " throughout the night without nighttime awakenings.  The patient also admits to sadness (see above).    Cognitive impairment:  She has a hx of wandering and states she recently drove to the nursing home to visit her sister but upon arrival was reminded that her sister  multiple years ago.  Patient is currently being treated with SSRI.      Past Medical History:   Diagnosis Date    Hypertension       Past Surgical History:   Procedure Laterality Date    BREAST BIOPSY Right     HYSTERECTOMY        Family History   Problem Relation Name Age of Onset    Depression Mother      Sinusitis Mother      Diabetes Daughter        Social History     Socioeconomic History    Marital status:     Number of children: 6   Occupational History    Occupation: Retired   Tobacco Use    Smoking status: Some Days     Current packs/day: 0.50     Average packs/day: 0.5 packs/day for 56.6 years (28.3 ttl pk-yrs)     Types: Cigarettes     Start date: 1968     Passive exposure: Never    Smokeless tobacco: Former   Substance and Sexual Activity    Alcohol use: Not Currently    Drug use: Never    Sexual activity: Not Currently     Social Drivers of Health     Financial Resource Strain: Low Risk  (2023)    Overall Financial Resource Strain (CARDIA)     Difficulty of Paying Living Expenses: Not hard at all   Food Insecurity: No Food Insecurity (2023)    Hunger Vital Sign     Worried About Running Out of Food in the Last Year: Never true     Ran Out of Food in the Last Year: Never true   Transportation Needs: No Transportation Needs (2023)    PRAPARE - Transportation     Lack of Transportation (Medical): No     Lack of Transportation (Non-Medical): No   Physical Activity: Sufficiently Active (2023)    Exercise Vital Sign     Days of Exercise per Week: 7 days     Minutes of Exercise per Session: 30 min   Stress: No Stress Concern Present (2023)    [employer] [place] [employer] - Occupational Stress  "Questionnaire     Feeling of Stress : Not at all   Housing Stability: Low Risk  (5/16/2023)    Housing Stability Vital Sign     Unable to Pay for Housing in the Last Year: No     Number of Places Lived in the Last Year: 1     Unstable Housing in the Last Year: No        Review of patient's allergies indicates:  No Known Allergies   Current Medications[1]     Review of Systems:  Review of Systems   Constitutional:  Negative for chills and fever.   Eyes:  Negative for blurred vision and double vision.   Respiratory:  Negative for cough and shortness of breath.    Cardiovascular:  Negative for chest pain.   Gastrointestinal:  Negative for abdominal pain, diarrhea, nausea and vomiting.   Skin:  Negative for rash.   Neurological:  Negative for dizziness, tremors and headaches.        Objective:   Vitals:    04/14/25 1420   BP: (!) 156/82   BP Location: Left arm   Patient Position: Sitting   Pulse: 61   Resp: 20   Temp: 98.2 °F (36.8 °C)   TempSrc: Oral   SpO2: 100%   Weight: 68.1 kg (150 lb 3.2 oz)   Height: 5' 4" (1.626 m)        Physical Exam  Vitals reviewed.   Constitutional:       General: She is not in acute distress.     Appearance: Normal appearance. She is not ill-appearing, toxic-appearing or diaphoretic.   HENT:      Head: Normocephalic.   Eyes:      General:         Right eye: No discharge.         Left eye: No discharge.      Extraocular Movements: Extraocular movements intact.      Conjunctiva/sclera: Conjunctivae normal.   Cardiovascular:      Rate and Rhythm: Normal rate and regular rhythm.      Pulses: Normal pulses.      Heart sounds: Normal heart sounds. No murmur heard.     No friction rub. No gallop.   Pulmonary:      Effort: Pulmonary effort is normal. No respiratory distress.      Breath sounds: Normal breath sounds. No stridor. No wheezing, rhonchi or rales.   Musculoskeletal:         General: Normal range of motion.      Cervical back: Normal range of motion and neck supple. No rigidity. "   Skin:     General: Skin is warm and dry.      Coloration: Skin is not pale.   Neurological:      General: No focal deficit present.      Mental Status: She is alert and oriented to person, place, and time. Mental status is at baseline.   Psychiatric:         Mood and Affect: Mood normal.         Behavior: Behavior normal.         Thought Content: Thought content normal.         Judgment: Judgment normal.          Recent labs:  CBC:  Lab Results   Component Value Date    WBC 3.91 (L) 12/21/2024    RBC 3.91 (L) 12/21/2024    HGB 12.0 12/21/2024    HCT 36.2 (L) 12/21/2024    MCV 92.6 12/21/2024    MCH 30.7 12/21/2024    MCHC 33.1 12/21/2024    RDW 15.9 12/21/2024     12/21/2024    MPV 9.2 12/21/2024      CMP:  Sodium   Date Value Ref Range Status   12/21/2024 136 136 - 145 mmol/L Final     Potassium   Date Value Ref Range Status   12/21/2024 3.7 3.5 - 5.1 mmol/L Final     Chloride   Date Value Ref Range Status   12/21/2024 114 (H) 98 - 107 mmol/L Final     CO2   Date Value Ref Range Status   12/21/2024 26 23 - 31 mmol/L Final     Blood Urea Nitrogen   Date Value Ref Range Status   12/21/2024 8.5 (L) 9.8 - 20.1 mg/dL Final     Creatinine   Date Value Ref Range Status   12/21/2024 0.87 0.55 - 1.02 mg/dL Final     Calcium   Date Value Ref Range Status   12/21/2024 9.9 8.4 - 10.2 mg/dL Final     Albumin   Date Value Ref Range Status   12/21/2024 3.6 3.4 - 4.8 g/dL Final     Bilirubin Total   Date Value Ref Range Status   12/21/2024 0.5 <=1.5 mg/dL Final     ALP   Date Value Ref Range Status   12/21/2024 91 40 - 150 unit/L Final     AST   Date Value Ref Range Status   12/21/2024 22 5 - 34 unit/L Final     ALT   Date Value Ref Range Status   12/21/2024 12 0 - 55 unit/L Final     Estimated GFR-Non    Date Value Ref Range Status   03/08/2022 >60        BMP:  Lab Results   Component Value Date     12/21/2024    K 3.7 12/21/2024     (H) 12/21/2024    CO2 26 12/21/2024    BUN 8.5 (L)  "12/21/2024    CREATININE 0.87 12/21/2024    CALCIUM 9.9 12/21/2024    EGFRNONAA >60 03/08/2022      Lipid Panel:  Lab Results   Component Value Date    CHOL 204 (H) 09/11/2023    CHOL 169 10/30/2017     Lab Results   Component Value Date    HDL 54 09/11/2023    HDL 52 10/30/2017     No results found for: "LDLCALC"  Lab Results   Component Value Date    TRIG 77 09/11/2023    TRIG 75 10/30/2017     No results found for: "CHOLHDL"   HbA1c:  Lab Results   Component Value Date    HGBA1C 6.2 10/30/2017      TSH:  Lab Results   Component Value Date    TSH 0.851 04/03/2023       Recent Imaging:       Assessment & Plan:    Dolores Ta is presenting as above and will be treated as follows:    Problem List Items Addressed This Visit       Cognitive impairment    Relevant Orders    Ambulatory referral/consult to Home Health    Hypertension - Primary    Relevant Orders    Lipid Panel    TSH    Comprehensive Metabolic Panel    CBC Auto Differential    GERD (gastroesophageal reflux disease)    Relevant Medications    pantoprazole (PROTONIX) 20 MG tablet    Insomnia    Relevant Medications    traZODone (DESYREL) 50 MG tablet    Depression    Relevant Medications    EScitalopram oxalate (LEXAPRO) 20 MG tablet    Osteopenia    Relevant Orders    DXA Bone Density Axial Skeleton 1 or more sites    Vitamin D    Vitamin B12    DXA Bone Density Axial Skeleton 1 or more sites     Other Visit Diagnoses         Breast calcifications on mammogram        Relevant Orders    Mammo Digital Diagnostic Bilat with Adam (XPD)    US Breast Bilateral Limited      Sclerosing adenosis of right breast        Relevant Orders    Mammo Digital Diagnostic Bilat with Adam (XPD)    US Breast Bilateral Limited      Fatigue, unspecified type        Relevant Orders    Vitamin D    Vitamin B12    Ambulatory referral/consult to Home Health      Hypoglycemia        Relevant Orders    Hemoglobin A1C      Disorder of bone density and structure, unspecified    "     Relevant Orders    Vitamin D      Other long term (current) drug therapy        Relevant Orders    Vitamin B12      Falls        Relevant Orders    Ambulatory referral/consult to Home Health              Dolores was seen today for follow-up.    Diagnoses and all orders for this visit:     1. Fatigue, unspecified type (Primary)  - Vitamin D; Future  - Vitamin B12; Future  - Ambulatory referral/consult to Home Health; Future  Increase Lexapro to 20 mg daily.  Concern for MDD as etiology for fatigue.  Decrease trazodone to 25 mg daily.    2. Moderate episode of recurrent major depressive disorder  - EScitalopram oxalate (LEXAPRO) 20 MG tablet; Take 1 tablet (20 mg total) by mouth once daily.  Dispense: 30 tablet; Refill: 3  See #1.  Seek immediate medical treatment for SOB, persistent panic attack, chest pain, suicidal thoughts or hallucinations.    3. Cognitive impairment  - Ambulatory referral/consult to Home Health; Future  Referral to home health.  Increase Lexapro and decrease trazodone as discussed.  SLUMS 7/30 in 2022.  Consider repeat SLUMS and brain imaging.  Counseled patient and family that patient should avoid driving.    4. Falls  - Ambulatory referral/consult to Home Health; Future  Referral to PT/OT with home health.  Encouraged use of walker.    5. Primary insomnia  - traZODone (DESYREL) 50 MG tablet; Take 0.5 tablets (25 mg total) by mouth every evening.  Dispense: 30 tablet; Refill: 1  See #1.  Consider sleep study however concern for compliance.    6. Gastroesophageal reflux disease, unspecified whether esophagitis present  - pantoprazole (PROTONIX) 20 MG tablet; Take 1 tablet (20 mg total) by mouth every evening.  Dispense: 30 tablet; Refill: 3  Start PPI as prescribed. Take at least 30 min prior to eating.  Avoid lying down after eating.  Avoid acidic or heavily seasoned foods such as tomato sauce, citrus fruits, etc.  Identify trigger foods and avoid if possible.  Increase water intake.  Monitor  for worsening symptoms and contact clinic if any concerns arise.  Consider referral to GI for EGD if symptoms persist.    7. Primary hypertension  - Lipid Panel; Future  - TSH; Future  - Comprehensive Metabolic Panel; Future  - CBC Auto Differential; Future  BP not at goal however patient only recently took antihypertensive in clinic.  Continue Lisinopril as prescribed.  Low sodium diet.  Monitor BP at home and notify MD if sBP >160 or <90. Also notify MD if dBP >100 or <60.  Limit caffeine intake.  Seek immediate medical treatment for chest pain, SOB, LE edema, severe headache, blurred vision, dizziness, slurred speech, any new or worsening symptoms.    8. Breast calcifications on mammogram  - Mammo Digital Diagnostic Bilat with Adam (XPD); Future  - US Breast Bilateral Limited; Future    9. Sclerosing adenosis of right breast  - Mammo Digital Diagnostic Bilat with Adam (XPD); Future  - US Breast Bilateral Limited; Future    10. Osteopenia of other site  - DXA Bone Density Axial Skeleton 1 or more sites; Future  - Vitamin D; Future  - Vitamin B12; Future    11. Hypoglycemia  - Hemoglobin A1C; Future    12. Disorder of bone density and structure, unspecified  - Vitamin D; Future    13. Other long term (current) drug therapy  - Vitamin B12; Future      I have explained the treatment plan, diagnosis, and prognosis to patient. All questions are answered to the best of my knowledge.   Face to face time 60 minutes, including documentation, chart review, counseling, education, review of test results, relevant medical records, and coordination of care.    Visit today is associated with current or anticipated ongoing medical care related to this patient's serious conditions/complex conditions as documented above.      Health Maintenance Reviewed:  Immunization History   Administered Date(s) Administered    COVID-19, MRNA, LN-S, PF (MODERNA FULL 0.5 ML DOSE) 03/03/2021, 03/30/2021    Influenza (FLUAD) - Quadrivalent -  Adjuvanted - PF *Preferred* (65+) 10/18/2022, 11/06/2023    Influenza - Trivalent - Fluad - Adjuvanted - PF (65 years and older 01/13/2025    Pneumococcal Conjugate - 13 Valent 02/21/2018    Pneumococcal Conjugate - 20 Valent 10/18/2022    Pneumococcal Polysaccharide - 23 Valent 01/15/2014, 11/25/2016, 10/11/2021    Zoster 01/15/2014      Hepatitis Screening:   Lab Results   Component Value Date    HEPCAB Nonreactive 06/12/2024        Aaron Francis MD  Geriatric Fellow - Family Medicine / Geriatric Medicine  [facility]         [1]   Current Outpatient Medications:     albuterol (VENTOLIN HFA) 90 mcg/actuation inhaler, Inhale 1-2 puffs into the lungs every 6 (six) hours as needed for Shortness of Breath or Wheezing., Disp: 1 g, Rfl: 5    albuterol-ipratropium (DUO-NEB) 2.5 mg-0.5 mg/3 mL nebulizer solution, Take 3 mLs by nebulization every 6 (six) hours as needed for Wheezing. Rescue, Disp: 75 mL, Rfl: 0    cholecalciferol, vitamin D3, (VITAMIN D3) 25 mcg (1,000 unit) capsule, Take 1 capsule (1,000 Units total) by mouth once daily., Disp: 90 capsule, Rfl: 3    EScitalopram oxalate (LEXAPRO) 20 MG tablet, Take 1 tablet (20 mg total) by mouth once daily., Disp: 30 tablet, Rfl: 3    lisinopriL (PRINIVIL,ZESTRIL) 40 MG tablet, Take 1 tablet (40 mg total) by mouth once daily., Disp: 90 tablet, Rfl: 1    meloxicam (MOBIC) 7.5 MG tablet, Take 1 tablet (7.5 mg total) by mouth once daily., Disp: 90 tablet, Rfl: 1    pantoprazole (PROTONIX) 20 MG tablet, Take 1 tablet (20 mg total) by mouth every evening., Disp: 30 tablet, Rfl: 3    traZODone (DESYREL) 50 MG tablet, Take 0.5 tablets (25 mg total) by mouth every evening., Disp: 30 tablet, Rfl: 1    triamcinolone acetonide 0.1% (KENALOG) 0.1 % cream, Apply topically 2 (two) times daily. for 14 days, Disp: 28.4 g, Rfl: 0

## 2025-04-14 NOTE — PATIENT INSTRUCTIONS
INCREASE LEXAPRO - ESCITALOPRAM (FOR MOOD) TO 20MG (CAN TAKE 2 PILLS OF YOUR CURRENT SUPPLY TOGETHER, UNTIL THE BOTTLE IS OVER - WE WILL SEND YOU A NEW PRESCRIPTION OF THE NEW DOSE OF 20MG)    DECREASE TRAZODONE (FOR SLEEP) FROM 50 DOWN TO 25MG (HALF PILL)    START NEW MEDICINE PROTONIX - PANTOPRAZOLE 40MG AT NIGHT (1/2 HOUR BEFORE DINNER) FOR ACID REFLUX

## 2025-04-16 ENCOUNTER — RESULTS FOLLOW-UP (OUTPATIENT)
Dept: FAMILY MEDICINE | Facility: CLINIC | Age: 80
End: 2025-04-16

## 2025-05-15 ENCOUNTER — OFFICE VISIT (OUTPATIENT)
Dept: FAMILY MEDICINE | Facility: CLINIC | Age: 80
End: 2025-05-15
Payer: COMMERCIAL

## 2025-05-15 VITALS
DIASTOLIC BLOOD PRESSURE: 90 MMHG | HEIGHT: 64 IN | WEIGHT: 152.19 LBS | RESPIRATION RATE: 18 BRPM | HEART RATE: 56 BPM | OXYGEN SATURATION: 99 % | TEMPERATURE: 99 F | SYSTOLIC BLOOD PRESSURE: 152 MMHG | BODY MASS INDEX: 25.98 KG/M2

## 2025-05-15 DIAGNOSIS — R92.1 BREAST CALCIFICATIONS ON MAMMOGRAM: ICD-10-CM

## 2025-05-15 DIAGNOSIS — I10 PRIMARY HYPERTENSION: ICD-10-CM

## 2025-05-15 DIAGNOSIS — R41.89 COGNITIVE IMPAIRMENT: Primary | ICD-10-CM

## 2025-05-15 DIAGNOSIS — F32.5 MAJOR DEPRESSIVE DISORDER IN REMISSION, UNSPECIFIED WHETHER RECURRENT: ICD-10-CM

## 2025-05-15 PROCEDURE — 99213 OFFICE O/P EST LOW 20 MIN: CPT | Mod: PBBFAC | Performed by: FAMILY MEDICINE

## 2025-05-15 NOTE — PROGRESS NOTES
[facility]  [facility]    DOS: 5/15/2025      Subjective:  Chief Complaint:    Chief Complaint   Patient presents with    Follow-up     F/u for BP       History of Present Illness:  Dolores Ta is a 79 y.o. female with a PMH of HTN, OA, depression, cognitive impairment, insomnia, chronic tobacco use, and osteopenia who presents accompanied by her sister in law today for: Follow up of Chronic Conditions: OA, HTN, breast nodule.    Cognitive impairment  TSH, Vitamin D, B12 4/14/25: WNL  Patient lives at home alone, receives home health that comes Monday and Thursday.  Is able to do BADLs and partially independent with IADLS. She reports is still driving to nearby store and to daughter's house in [place]. Smoke alarm present inside house. She has no hx of leaving stove on unattended. No firearms in house. She is able to ambulate around without issues. Daughter lives nearby and assists with medication management and drivers patient to most errands.     HTN:  Currently treated with lisinopril 40 mg daily.  BP upon arrival: 152/90; patient does not remember if she took her medications this morning  Patient denies SOB, chest pain, headaches or blurred vision.    Right breast calcifications:  Hx of calcifications s/p biopsy (04/26/2023) revealing atypical apocrine adenosis, sclerosing adenosis and associated microcalcifications. No evidence of malignancy.    Patient was scheduled for repeat diagnostic MMG and breast US but did not show for appointment.  MMG reordered 4/14/25. Encouraged compliance with follow up.    MDD/Insomnia:  Currently being treated with Lexapro 20 mg daily and trazodone 25 mg qhs.  Patient reports sleeping well and not waking up middle of the night          Past Medical History:   Diagnosis Date    Hypertension       Past Surgical History:   Procedure Laterality Date    BREAST BIOPSY Right     HYSTERECTOMY        Family History   Problem  Relation Name Age of Onset    Depression Mother      Sinusitis Mother      Diabetes Daughter        Social History     Socioeconomic History    Marital status:     Number of children: 6   Occupational History    Occupation: Retired   Tobacco Use    Smoking status: Some Days     Current packs/day: 0.50     Average packs/day: 0.5 packs/day for 56.7 years (28.3 ttl pk-yrs)     Types: Cigarettes     Start date: 9/11/1968     Passive exposure: Never    Smokeless tobacco: Former   Substance and Sexual Activity    Alcohol use: Not Currently    Drug use: Never    Sexual activity: Not Currently     Social Drivers of Health     Financial Resource Strain: Low Risk  (5/16/2023)    Overall Financial Resource Strain (CARDIA)     Difficulty of Paying Living Expenses: Not hard at all   Food Insecurity: No Food Insecurity (5/16/2023)    Hunger Vital Sign     Worried About Running Out of Food in the Last Year: Never true     Ran Out of Food in the Last Year: Never true   Transportation Needs: No Transportation Needs (5/16/2023)    PRAPARE - Transportation     Lack of Transportation (Medical): No     Lack of Transportation (Non-Medical): No   Physical Activity: Sufficiently Active (5/16/2023)    Exercise Vital Sign     Days of Exercise per Week: 7 days     Minutes of Exercise per Session: 30 min   Stress: No Stress Concern Present (5/16/2023)    [employer] - Occupational Stress Questionnaire     Feeling of Stress : Not at all   Housing Stability: Low Risk  (5/16/2023)    Housing Stability Vital Sign     Unable to Pay for Housing in the Last Year: No     Number of Places Lived in the Last Year: 1     Unstable Housing in the Last Year: No        Review of patient's allergies indicates:  No Known Allergies   Current Medications[1]     Geriatrics Assessment (10/18/22)  BADLs: bathes independently, grooming and dressing independently, no continence issues, feeds independently. Has not forgotten to turn  "off stove/oven.  IADLs:  Daughter manages finances, still drives, goes shopping, no issues with meal preparation or laundry  Fall risk: last fall: reports 0 fall in the past 3 months,   Labs: TSH wnl,  Cognition: SLUMS 7/30 (3/2022)  Mood: appropriate,  denies depressive symptoms  Polypharmacy: high risk medications: escitalopram, trazodone, donepezil   Social support: Children come to visit her regularly  Goals of care: daughter wants patient to continue living at home with support from family members as well as home health   Advance care preferences:   No POA states richmond (daughter) just helps with finances.   Nutrition/weight change: no weight loss, good appetite   Vision: No recent changes to vision; wears glasses  Hearing: No issues with hearing   Living situation: lives alone      Review of Systems:  Review of Systems   Respiratory:  Negative for shortness of breath.    Cardiovascular:  Negative for chest pain.   Gastrointestinal:  Negative for abdominal pain.   Neurological:  Negative for dizziness, weakness and headaches.   Psychiatric/Behavioral:  Negative for depression. The patient does not have insomnia.         Objective:   Vitals:    05/15/25 1121 05/15/25 1126   BP: (!) 164/83 (!) 152/90   BP Location: Right arm Right arm   Patient Position: Sitting Sitting   Pulse: (!) 56    Resp: 18    Temp: 98.5 °F (36.9 °C)    TempSrc: Oral    SpO2: 99%    Weight: 69 kg (152 lb 3.2 oz)    Height: 5' 4" (1.626 m)           Physical Exam  Cardiovascular:      Rate and Rhythm: Normal rate and regular rhythm.   Pulmonary:      Effort: Pulmonary effort is normal. No respiratory distress.   Skin:     General: Skin is warm and dry.   Neurological:      Mental Status: She is alert and oriented to person, place, and time.   Psychiatric:         Mood and Affect: Mood normal.         Behavior: Behavior normal.          Recent labs:  CBC:  Lab Results   Component Value Date    WBC 3.36 (L) 04/14/2025    RBC 4.23 04/14/2025 " "   HGB 12.9 04/14/2025    HCT 38.0 04/14/2025    MCV 89.8 04/14/2025    MCH 30.5 04/14/2025    MCHC 33.9 04/14/2025    RDW 15.3 04/14/2025     04/14/2025    MPV 9.4 04/14/2025      CMP:  Sodium   Date Value Ref Range Status   04/14/2025 139 136 - 145 mmol/L Final     Potassium   Date Value Ref Range Status   04/14/2025 4.5 3.5 - 5.1 mmol/L Final     Chloride   Date Value Ref Range Status   04/14/2025 107 98 - 107 mmol/L Final     CO2   Date Value Ref Range Status   04/14/2025 27 23 - 31 mmol/L Final     Glucose   Date Value Ref Range Status   04/14/2025 85 82 - 115 mg/dL Final     Blood Urea Nitrogen   Date Value Ref Range Status   04/14/2025 9.0 (L) 9.8 - 20.1 mg/dL Final     Creatinine   Date Value Ref Range Status   04/14/2025 0.90 0.55 - 1.02 mg/dL Final     Calcium   Date Value Ref Range Status   04/14/2025 10.2 8.4 - 10.2 mg/dL Final     Protein Total   Date Value Ref Range Status   04/14/2025 8.2 (H) 5.8 - 7.6 gm/dL Final     Albumin   Date Value Ref Range Status   04/14/2025 3.7 3.4 - 4.8 g/dL Final     Bilirubin Total   Date Value Ref Range Status   04/14/2025 0.6 <=1.5 mg/dL Final     ALP   Date Value Ref Range Status   04/14/2025 102 40 - 150 unit/L Final     AST   Date Value Ref Range Status   04/14/2025 20 11 - 45 unit/L Final     ALT   Date Value Ref Range Status   04/14/2025 10 0 - 55 unit/L Final     Estimated GFR-Non    Date Value Ref Range Status   03/08/2022 >60        BMP:  Lab Results   Component Value Date     04/14/2025    K 4.5 04/14/2025     04/14/2025    CO2 27 04/14/2025    BUN 9.0 (L) 04/14/2025    CREATININE 0.90 04/14/2025    CALCIUM 10.2 04/14/2025    EGFRNONAA >60 03/08/2022      Lipid Panel:  Lab Results   Component Value Date    CHOL 177 04/14/2025    CHOL 204 (H) 09/11/2023    CHOL 169 10/30/2017     Lab Results   Component Value Date    HDL 56 04/14/2025    HDL 54 09/11/2023    HDL 52 10/30/2017     No results found for: "LDLCALC"  Lab Results " "  Component Value Date    TRIG 62 04/14/2025    TRIG 77 09/11/2023    TRIG 75 10/30/2017     No results found for: "CHOLHDL"   HbA1c:  Lab Results   Component Value Date    HGBA1C 5.3 04/14/2025      TSH:  Lab Results   Component Value Date    TSH 1.415 04/14/2025       Recent Imaging:       Assessment & Plan:    Dolores Ta is presenting as above and will be treated as follows:    Dolores was seen today for follow-up.    Diagnoses and all orders for this visit:    Cognitive impairment  - advised daughter to remove access to driving car until [place] driving evaluation is completed and reviewed  - continue medication management with home health and with daughter; she appears to be compliant with home meds  - continue to optimize living conditions at home (smoking alarm present, no access to firearms)  - will return next clinic for repeat cognitive assessment    HTN:  - continue lisinopril 40 mg qd    Right breast calcifications:  - phone number to central scheduling provided to daughter to schedule mammogram and ultrasound    MDD/Insomnia:  Continue Lexapro 20 mg daily and trazodone 25 mg qhs.    I have explained the treatment plan, diagnosis, and prognosis to patient. All questions are answered to the best of my knowledge.   Face to face time 60 minutes, including documentation, chart review, counseling, education, review of test results, relevant medical records, and coordination of care.    Visit today is associated with current or anticipated ongoing medical care related to this patient's serious conditions/complex conditions as documented above.      Health Maintenance Reviewed:  Immunization History   Administered Date(s) Administered    COVID-19, MRNA, LN-S, PF (MODERNA FULL 0.5 ML DOSE) 03/03/2021, 03/30/2021    Influenza (FLUAD) - Quadrivalent - Adjuvanted - PF *Preferred* (65+) 10/18/2022, 11/06/2023    Influenza - Trivalent - Fluad - Adjuvanted - PF (65 years and older 01/13/2025    Pneumococcal " Conjugate - 13 Valent 02/21/2018    Pneumococcal Conjugate - 20 Valent 10/18/2022    Pneumococcal Polysaccharide - 23 Valent 01/15/2014, 11/25/2016, 10/11/2021    Zoster 01/15/2014      Hepatitis Screening:   Lab Results   Component Value Date    HEPCAB Nonreactive 06/12/2024      RTC in 2-3 weeks for repeat cognitive assessment and review of driving evaluation    Isaías Bello MD  Family Medicine, [facility]                  [1]   Current Outpatient Medications:     albuterol (VENTOLIN HFA) 90 mcg/actuation inhaler, Inhale 1-2 puffs into the lungs every 6 (six) hours as needed for Shortness of Breath or Wheezing., Disp: 1 g, Rfl: 5    albuterol-ipratropium (DUO-NEB) 2.5 mg-0.5 mg/3 mL nebulizer solution, Take 3 mLs by nebulization every 6 (six) hours as needed for Wheezing. Rescue, Disp: 75 mL, Rfl: 0    cholecalciferol, vitamin D3, (VITAMIN D3) 25 mcg (1,000 unit) capsule, Take 1 capsule (1,000 Units total) by mouth once daily., Disp: 90 capsule, Rfl: 3    EScitalopram oxalate (LEXAPRO) 20 MG tablet, Take 1 tablet (20 mg total) by mouth once daily., Disp: 30 tablet, Rfl: 3    lisinopriL (PRINIVIL,ZESTRIL) 40 MG tablet, Take 1 tablet (40 mg total) by mouth once daily., Disp: 90 tablet, Rfl: 1    meloxicam (MOBIC) 7.5 MG tablet, Take 1 tablet (7.5 mg total) by mouth once daily., Disp: 90 tablet, Rfl: 1    pantoprazole (PROTONIX) 20 MG tablet, Take 1 tablet (20 mg total) by mouth every evening., Disp: 30 tablet, Rfl: 3    traZODone (DESYREL) 50 MG tablet, Take 0.5 tablets (25 mg total) by mouth every evening., Disp: 30 tablet, Rfl: 1    triamcinolone acetonide 0.1% (KENALOG) 0.1 % cream, Apply topically 2 (two) times daily. for 14 days, Disp: 28.4 g, Rfl: 0

## 2025-06-03 ENCOUNTER — OFFICE VISIT (OUTPATIENT)
Dept: FAMILY MEDICINE | Facility: CLINIC | Age: 80
End: 2025-06-03
Payer: COMMERCIAL

## 2025-06-03 VITALS
TEMPERATURE: 98 F | HEIGHT: 64 IN | BODY MASS INDEX: 26.29 KG/M2 | DIASTOLIC BLOOD PRESSURE: 84 MMHG | SYSTOLIC BLOOD PRESSURE: 142 MMHG | HEART RATE: 68 BPM | WEIGHT: 154 LBS | OXYGEN SATURATION: 98 %

## 2025-06-03 DIAGNOSIS — R92.1 BREAST CALCIFICATIONS ON MAMMOGRAM: ICD-10-CM

## 2025-06-03 DIAGNOSIS — F51.01 PRIMARY INSOMNIA: ICD-10-CM

## 2025-06-03 DIAGNOSIS — M15.0 PRIMARY OSTEOARTHRITIS INVOLVING MULTIPLE JOINTS: ICD-10-CM

## 2025-06-03 DIAGNOSIS — I10 PRIMARY HYPERTENSION: ICD-10-CM

## 2025-06-03 DIAGNOSIS — F33.1 MODERATE EPISODE OF RECURRENT MAJOR DEPRESSIVE DISORDER: ICD-10-CM

## 2025-06-03 DIAGNOSIS — R41.89 COGNITIVE IMPAIRMENT: Primary | ICD-10-CM

## 2025-06-03 PROCEDURE — 99214 OFFICE O/P EST MOD 30 MIN: CPT | Mod: PBBFAC | Performed by: FAMILY MEDICINE

## 2025-06-04 RX ORDER — TRAZODONE HYDROCHLORIDE 50 MG/1
25 TABLET ORAL NIGHTLY
Qty: 30 TABLET | Refills: 1 | Status: SHIPPED | OUTPATIENT
Start: 2025-06-04

## 2025-06-04 RX ORDER — LISINOPRIL 40 MG/1
40 TABLET ORAL DAILY
Qty: 90 TABLET | Refills: 1 | Status: SHIPPED | OUTPATIENT
Start: 2025-06-04

## 2025-06-04 RX ORDER — MELOXICAM 7.5 MG/1
7.5 TABLET ORAL DAILY
Qty: 90 TABLET | Refills: 1 | Status: SHIPPED | OUTPATIENT
Start: 2025-06-04

## 2025-07-29 DIAGNOSIS — K21.9 GASTROESOPHAGEAL REFLUX DISEASE, UNSPECIFIED WHETHER ESOPHAGITIS PRESENT: ICD-10-CM

## 2025-07-29 RX ORDER — PANTOPRAZOLE SODIUM 20 MG/1
20 TABLET, DELAYED RELEASE ORAL NIGHTLY
Qty: 90 TABLET | Refills: 1 | Status: SHIPPED | OUTPATIENT
Start: 2025-07-29